# Patient Record
Sex: MALE | Race: BLACK OR AFRICAN AMERICAN | NOT HISPANIC OR LATINO | Employment: FULL TIME | URBAN - METROPOLITAN AREA
[De-identification: names, ages, dates, MRNs, and addresses within clinical notes are randomized per-mention and may not be internally consistent; named-entity substitution may affect disease eponyms.]

---

## 2019-02-20 ENCOUNTER — HOSPITAL ENCOUNTER (EMERGENCY)
Facility: HOSPITAL | Age: 59
Discharge: HOME | End: 2019-02-20
Attending: EMERGENCY MEDICINE | Admitting: EMERGENCY MEDICINE
Payer: COMMERCIAL

## 2019-02-20 ENCOUNTER — APPOINTMENT (EMERGENCY)
Dept: RADIOLOGY | Facility: HOSPITAL | Age: 59
End: 2019-02-20
Payer: COMMERCIAL

## 2019-02-20 VITALS
DIASTOLIC BLOOD PRESSURE: 70 MMHG | OXYGEN SATURATION: 98 % | RESPIRATION RATE: 20 BRPM | SYSTOLIC BLOOD PRESSURE: 146 MMHG | HEART RATE: 59 BPM
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
## 2019-02-20 DIAGNOSIS — S70.02XA CONTUSION OF LEFT HIP, INITIAL ENCOUNTER: ICD-10-CM

## 2019-02-20 PROCEDURE — 73502 X-RAY EXAM HIP UNI 2-3 VIEWS: CPT

## 2019-02-20 PROCEDURE — 99283 EMERGENCY DEPT VISIT LOW MDM: CPT

## 2019-02-20 RX ORDER — METHOCARBAMOL 500 MG/1
1000 TABLET, FILM COATED ORAL 2 TIMES DAILY
Qty: 30 TABLET | Refills: 0 | Status: SHIPPED | OUTPATIENT
Start: 2019-02-20 | End: 2020-03-31 | Stop reason: ALTCHOICE

## 2019-02-20 RX ORDER — IBUPROFEN 600 MG/1
600 TABLET ORAL EVERY 6 HOURS PRN
Qty: 30 TABLET | Refills: 0 | Status: SHIPPED | OUTPATIENT
Start: 2019-02-20 | End: 2019-02-20 | Stop reason: SDUPTHER

## 2019-02-20 RX ORDER — IBUPROFEN 600 MG/1
600 TABLET ORAL EVERY 6 HOURS PRN
Qty: 30 TABLET | Refills: 0 | Status: SHIPPED | OUTPATIENT
Start: 2019-02-20 | End: 2020-07-15

## 2019-02-20 RX ORDER — METHOCARBAMOL 500 MG/1
1000 TABLET, FILM COATED ORAL ONCE
Status: DISCONTINUED | OUTPATIENT
Start: 2019-02-20 | End: 2019-02-20 | Stop reason: HOSPADM

## 2019-02-20 RX ORDER — METHOCARBAMOL 500 MG/1
1000 TABLET, FILM COATED ORAL 2 TIMES DAILY
Qty: 30 TABLET | Refills: 0 | Status: SHIPPED | OUTPATIENT
Start: 2019-02-20 | End: 2019-02-20 | Stop reason: SDUPTHER

## 2019-02-20 RX ORDER — IBUPROFEN 600 MG/1
600 TABLET ORAL ONCE
Status: DISCONTINUED | OUTPATIENT
Start: 2019-02-20 | End: 2019-02-20 | Stop reason: HOSPADM

## 2019-02-21 NOTE — ED PROVIDER NOTES
History  Chief Complaint   Patient presents with    Fall     pt states he fell on ice at store and struck left hip/leg on ground; denies thinners     8year-old male patient presents emergency department for evaluation of a slip and fall injury with left hip contusion  According to the patient got out of car, lost his footing and she device, fell onto his rear end striking his left hip and his left shin on the door  The patient came in for evaluation of left hip pain  The patient, however, is ambulatory, has good pulse motor and sensory in the affected extremity, will have a but be evaluated with      History provided by:  Patient   used: No    Fall   Mechanism of injury: fall    Injury location:  Pelvis  Pelvic injury location:  Pelvis  Arrived directly from scene: no    Fall:     Fall occurred:  Tripped and walking  Prior to arrival data:     Blood loss:  None    Orientation at scene:  Person, place, situation and time    Loss of consciousness: no      Amnesic to event: no      Airway interventions:  None  Associated symptoms: no abdominal pain, no back pain, no blindness, no chest pain, no difficulty breathing, no hearing loss, no nausea and no vomiting    Risk factors: no anticoagulation therapy, no CABG, no COPD, no pacemaker, not pregnant and no steroid use        None       No past medical history on file  Past Surgical History:   Procedure Laterality Date    KNEE SURGERY         No family history on file  I have reviewed and agree with the history as documented  Social History     Tobacco Use    Smoking status: Former Smoker    Smokeless tobacco: Never Used   Substance Use Topics    Alcohol use: Yes    Drug use: Never        Review of Systems   HENT: Negative for hearing loss  Eyes: Negative for blindness  Cardiovascular: Negative for chest pain  Gastrointestinal: Negative for abdominal pain, nausea and vomiting  Musculoskeletal: Negative for back pain     All other systems reviewed and are negative  Physical Exam  Physical Exam   Constitutional: He is oriented to person, place, and time  He appears well-developed and well-nourished  No distress  HENT:   Head: Normocephalic and atraumatic  Right Ear: External ear normal    Left Ear: External ear normal    Eyes: Conjunctivae and EOM are normal  Right eye exhibits no discharge  Left eye exhibits no discharge  No scleral icterus  Neck: Normal range of motion  Neck supple  No JVD present  No tracheal deviation present  No thyromegaly present  Cardiovascular: Normal rate and regular rhythm  Pulmonary/Chest: Effort normal and breath sounds normal  No stridor  No respiratory distress  He has no wheezes  He has no rales  Abdominal: Soft  Bowel sounds are normal  He exhibits no distension  There is no tenderness  Musculoskeletal: Normal range of motion  He exhibits no edema, tenderness or deformity  Neurological: He is alert and oriented to person, place, and time  No cranial nerve deficit  Coordination normal    Skin: Skin is warm and dry  He is not diaphoretic  Psychiatric: He has a normal mood and affect  His behavior is normal    Nursing note and vitals reviewed        Vital Signs  ED Triage Vitals   Temp Pulse Respirations Blood Pressure SpO2   -- 02/20/19 2038 02/20/19 2038 02/20/19 2054 02/20/19 2038    59 20 146/70 98 %      Temp src Heart Rate Source Patient Position - Orthostatic VS BP Location FiO2 (%)   -- 02/20/19 2038 -- -- --    Monitor         Pain Score       02/20/19 2038       6           Vitals:    02/20/19 2038 02/20/19 2054   BP:  146/70   Pulse: 59        Visual Acuity      ED Medications  Medications - No data to display    Diagnostic Studies  Results Reviewed     None                 XR hip/pelv 2-3 vws left if performed   ED Interpretation by Kelly Schmidt DO (02/20 2119)   No acute fracture      Final Result by Nehemias Bob MD (02/20 2137)      No evidence of acute left hip fracture or dislocation  Workstation performed: [id number]                    Procedures  Procedures       Phone Contacts  ED Phone Contact    ED Course                               MDM  Number of Diagnoses or Management Options  Contusion of left hip, initial encounter: new and requires workup  Fall, initial encounter: new and requires workup     Amount and/or Complexity of Data Reviewed  Clinical lab tests: ordered and reviewed  Tests in the radiology section of CPT®: ordered and reviewed  Decide to obtain previous medical records or to obtain history from someone other than the patient: yes  Review and summarize past medical records: yes    Patient Progress  Patient progress: stable      Disposition  Final diagnoses:   Fall, initial encounter   Contusion of left hip, initial encounter     Time reflects when diagnosis was documented in both MDM as applicable and the Disposition within this note     Time User Action Codes Description Comment    2/20/2019  9:19 PM Priscila Rincon Add [Z37  XXXA] Fall, initial encounter     2/20/2019  9:19 PM Priscila Rincon Add [S70 02XA] Contusion of left hip, initial encounter       ED Disposition     ED Disposition Condition Date/Time Comment    Discharge Stable Wed Feb 20, 2019  9:19 PM Antonio George discharge to home/self care              Follow-up Information     Follow up With Specialties Details Why [facility],  Family Medicine   [address] [place] [address] [address]   [address] [place] [id number]  [phone]            Discharge Medication List as of 2/20/2019  9:22 PM      START taking these medications    Details   ibuprofen (MOTRIN) 600 mg tablet Take 1 tablet (600 mg total) by mouth every 6 (six) hours as needed for mild pain for up to 3 days, Starting Wed 2/20/2019, Until Sat 2/23/2019, Normal      methocarbamol (ROBAXIN) 500 mg tablet Take 2 tablets (1,000 mg total) by mouth 2 (two) times a day, Starting Wed 2/20/2019, Normal           No discharge procedures on file      ED Provider  Electronically Signed by           Sherry Dasilva DO  02/22/19 4341

## 2019-03-23 ENCOUNTER — HOSPITAL ENCOUNTER (EMERGENCY)
Facility: HOSPITAL | Age: 59
Discharge: HOME | End: 2019-03-23
Attending: EMERGENCY MEDICINE | Admitting: EMERGENCY MEDICINE
Payer: COMMERCIAL

## 2019-03-23 VITALS
DIASTOLIC BLOOD PRESSURE: 81 MMHG | OXYGEN SATURATION: 98 % | WEIGHT: 245 LBS | HEART RATE: 50 BPM | SYSTOLIC BLOOD PRESSURE: 171 MMHG | BODY MASS INDEX: 33.18 KG/M2 | RESPIRATION RATE: 21 BRPM | HEIGHT: 72 IN | TEMPERATURE: 98.4 F

## 2019-03-23 DIAGNOSIS — S76.311A RIGHT HAMSTRING MUSCLE STRAIN: Primary | ICD-10-CM

## 2019-03-23 DIAGNOSIS — M79.18 PAIN IN RIGHT BUTTOCK: ICD-10-CM

## 2019-03-23 LAB
ATRIAL RATE: 45 BPM
P AXIS: 64 DEGREES
PR INTERVAL: 182 MS
QRS AXIS: 31 DEGREES
QRSD INTERVAL: 106 MS
QT INTERVAL: 436 MS
QTC INTERVAL: 377 MS
T WAVE AXIS: 9 DEGREES
VENTRICULAR RATE: 45 BPM

## 2019-03-23 PROCEDURE — 93010 ELECTROCARDIOGRAM REPORT: CPT | Performed by: INTERNAL MEDICINE

## 2019-03-23 PROCEDURE — 99283 EMERGENCY DEPT VISIT LOW MDM: CPT

## 2019-03-23 PROCEDURE — 93005 ELECTROCARDIOGRAM TRACING: CPT

## 2019-03-23 RX ORDER — IBUPROFEN 600 MG/1
600 TABLET ORAL ONCE
Status: COMPLETED | OUTPATIENT
Start: 2019-03-23 | End: 2019-03-23

## 2019-03-23 RX ORDER — CYCLOBENZAPRINE HCL 10 MG
10 TABLET ORAL 2 TIMES DAILY PRN
Qty: 10 TABLET | Refills: 0 | Status: SHIPPED | OUTPATIENT
Start: 2019-03-23 | End: 2019-04-11

## 2019-03-23 RX ORDER — CYCLOBENZAPRINE HCL 10 MG
10 TABLET ORAL ONCE
Status: COMPLETED | OUTPATIENT
Start: 2019-03-23 | End: 2019-03-23

## 2019-03-23 RX ORDER — LIDOCAINE 50 MG/G
1 PATCH TOPICAL ONCE
Status: DISCONTINUED | OUTPATIENT
Start: 2019-03-23 | End: 2019-03-23 | Stop reason: HOSPADM

## 2019-03-23 RX ADMIN — CYCLOBENZAPRINE HYDROCHLORIDE 10 MG: 10 TABLET, FILM COATED ORAL at 09:41

## 2019-03-23 RX ADMIN — LIDOCAINE 1 PATCH: 50 PATCH TOPICAL at 09:41

## 2019-03-23 RX ADMIN — IBUPROFEN 600 MG: 600 TABLET ORAL at 09:41

## 2019-03-23 NOTE — DISCHARGE INSTRUCTIONS
Take 400-600 mg Ibuprofen every 6 hours  Take muscle relaxant as needed- do not take both at the same time  Do not drive/work while taking  Take Tylenol as needed  Apply Lidoderm patches/cream as needed- patches can be bought over the counter- on for 12 hours/off for 12 hours  Try applying heat and doing stretches  Call to establish care with Sports Medicine doctor  Call to follow up with PCP regarding blood pressure/heart rate   Return to ED if any new/worsening symptoms

## 2019-03-23 NOTE — ED PROVIDER NOTES
History  Chief Complaint   Patient presents with    Leg Pain     Patient c/o right sided leg pain that radiates down his leg and up into the lower back  Patient states pain is worse after prolonged sitting  Patient denies any acute injury  60 yo M presenting with 1 week of R buttock/posterior thigh pain  Pt reports pain started spontaneously, cannot recall any inciting event or trauma  Pt reprots pain mainly to R buttocks, but radiates down posterior thigh, does not extend beyond knee  Reports pain is worse in the morning, improves with activity/movement/throughout the day  Reports feels like when he injured his hamspring in the past  Has been able to ambulate  No direct trauma  Tried Robaxin and Ibuprofen, last does was last night  Does report he had a fall about 3 weeks ago, fell on ice and landed on L hip  Seen in ED at that time, x-ray negative, pain to L hip completely resolved  Denies that he was walking differently/favoring R side from that injury  Denies any numbness/tingling/weakness  Denies any fevers, chills, abnormal weight loss, CP, SOB, back pain, abdominal pain, N/V/D/C, urinary complaints, retention/incontinence, saddle anesthesia, numbness/tingling  No chronic medical problems  Pt noted to be bradycardic, reports that this is chronic and has been worked up in the past  Denies any lightheadedness/dizziness, near syncope/syncope  Denies tobacco/drug use, occasional ETOH  A/P: 60 yo M with R buttock/posterior thigh pain, likely MSK strain/possibly pain from nerve- no red flags, symptomatic treatment with NSAIDs/muscle relaxants/lidoderm patches/icy-hot, stretching exercises, sports med f/u  Given bradycardia- will get EKG to r/o heart block  F/u PCP regarding bradycardia and elevated BP          Prior to Admission Medications   Prescriptions Last Dose Informant Patient Reported?  Taking?   ibuprofen (MOTRIN) 600 mg tablet   No No   Sig: Take 1 tablet (600 mg total) by mouth every 6 (six) hours as needed for mild pain for up to 3 days   methocarbamol (ROBAXIN) 500 mg tablet   No No   Sig: Take 2 tablets (1,000 mg total) by mouth 2 (two) times a day      Facility-Administered Medications: None       History reviewed  No pertinent past medical history  Past Surgical History:   Procedure Laterality Date    KNEE SURGERY         History reviewed  No pertinent family history  I have reviewed and agree with the history as documented  Social History     Tobacco Use    Smoking status: Former Smoker    Smokeless tobacco: Never Used   Substance Use Topics    Alcohol use: Yes    Drug use: Never        Review of Systems   Constitutional: Negative for chills and fever  HENT: Negative for rhinorrhea and sore throat  Respiratory: Negative for cough and shortness of breath  Cardiovascular: Negative for chest pain and leg swelling  Gastrointestinal: Negative for abdominal pain, constipation, diarrhea, nausea and vomiting  Genitourinary: Negative for dysuria, frequency, hematuria and urgency  Musculoskeletal: Negative for back pain, myalgias and neck pain         + R buttock/thigh pain   Skin: Negative for color change and rash  Allergic/Immunologic: Negative for immunocompromised state  Neurological: Negative for dizziness, weakness, light-headedness, numbness and headaches  Hematological: Negative for adenopathy  Does not bruise/bleed easily  Psychiatric/Behavioral: Negative for agitation and confusion  All other systems reviewed and are negative  Physical Exam  Physical Exam   Constitutional: He is oriented to person, place, and time  He appears well-developed and well-nourished  HENT:   Head: Normocephalic and atraumatic  Mouth/Throat: Oropharynx is clear and moist    Cardiovascular: Normal rate, regular rhythm, normal heart sounds and intact distal pulses  Pulmonary/Chest: Effort normal and breath sounds normal  No respiratory distress  Abdominal: Soft   He exhibits no distension  There is no tenderness  There is no rebound  Back: no midline C/T/L spine ttp  Negative straight leg raise test b/l  Normal reflexes  Normal strength 5/5 throughout  Sensation grossly intact  No skin changes/swelling  TTP over R ischial tuberosity and mildly along R posterior thigh  Full ROM of all joints including b/l hips  Musculoskeletal: Normal range of motion  He exhibits no edema or deformity  Neurological: He is alert and oriented to person, place, and time  He exhibits normal muscle tone  Coordination normal    Skin: Skin is warm and dry  No rash noted  Psychiatric: He has a normal mood and affect  Judgment and thought content normal    Nursing note and vitals reviewed        Vital Signs  ED Triage Vitals   Temperature Pulse Respirations Blood Pressure SpO2   03/23/19 0919 03/23/19 0917 03/23/19 0917 03/23/19 0917 03/23/19 0917   98 4 °F (36 9 °C) (!) 43 18 (!) 179/113 98 %      Temp Source Heart Rate Source Patient Position - Orthostatic VS BP Location FiO2 (%)   03/23/19 0919 03/23/19 0917 03/23/19 0917 03/23/19 0917 --   Oral Monitor Sitting Right arm       Pain Score       03/23/19 0941       Worst Possible Pain           Vitals:    03/23/19 0917 03/23/19 0945 03/23/19 0957   BP: (!) 179/113  (!) 171/81   Pulse: (!) 43 (!) 48 (!) 50   Patient Position - Orthostatic VS: Sitting           Visual Acuity      ED Medications  Medications   lidocaine (LIDODERM) 5 % patch 1 patch (1 patch Topical Medication Applied 3/23/19 0941)   ibuprofen (MOTRIN) tablet 600 mg (600 mg Oral Given 3/23/19 0941)   cyclobenzaprine (FLEXERIL) tablet 10 mg (10 mg Oral Given 3/23/19 0941)       Diagnostic Studies  Results Reviewed     None                 No orders to display              Procedures  ECG 12 Lead Documentation  Date/Time: 3/23/2019 9:50 AM  Performed by: Noman Souza DO  Authorized by: Noman Souza DO     Indications / Diagnosis:  Bradycardia, asymptomatic  ECG reviewed by me, the ED Provider: yes    Patient location:  ED  Previous ECG:     Previous ECG:  Unavailable  Rate:     ECG rate:  45  Rhythm:     Rhythm: sinus rhythm    Ectopy:     Ectopy: none    QRS:     QRS axis:  Normal  Conduction:     Conduction: normal    ST segments:     ST segments:  Non-specific  T waves:     T waves: normal             Phone Contacts  ED Phone Contact    ED Course                               MDM  Number of Diagnoses or Management Options  Pain in right buttock:   Right hamstring muscle strain:   Diagnosis management comments: R buttock/posterior thigh pain, likely MSK strain (hamstring)/possibly pain from nerve- no red flags, symptomatic treatment with NSAIDs/muscle relaxants/lidoderm patches/icy-hot, stretching exercises, sports med f/u  asymptomatic HTN/bradycardia- f/u PCP  Return precautions discussed with pt who expressed understanding       Amount and/or Complexity of Data Reviewed  Tests in the radiology section of CPT®: reviewed  Review and summarize past medical records: yes        Disposition  Final diagnoses:   Right hamstring muscle strain   Pain in right buttock     Time reflects when diagnosis was documented in both MDM as applicable and the Disposition within this note     Time User Action Codes Description Comment    3/23/2019  9:42 AM Mannie MARTIN Add [H52 579H] Right hamstring muscle strain     3/23/2019  9:44 AM Mannie MARTIN Add [M79 18] Pain in right buttock       ED Disposition     ED Disposition Condition Date/Time Comment    Discharge Stable Sat Mar 23, 2019  9:42 AM Arlester Schirmer discharge to home/self care              Follow-up Information     Follow up With Specialties Details Why [address] [place] [address] Family Medicine   [facility] [id number] [place] [address] [id number]            Patient's Medications   Discharge Prescriptions    CYCLOBENZAPRINE (FLEXERIL) 10 MG TABLET    Take 1 tablet (10 mg total) by mouth 2 (two) times a day as needed for muscle spasms       Start Date: 3/23/2019 End Date: --       Order Dose: 10 mg       Quantity: 10 tablet    Refills: 0     No discharge procedures on file      ED Provider  Electronically Signed by           Santiago Quintanilla DO  03/23/19 7216

## 2019-03-27 ENCOUNTER — APPOINTMENT (OUTPATIENT)
Dept: RADIOLOGY | Facility: CLINIC | Age: 59
End: 2019-03-27
Payer: COMMERCIAL

## 2019-03-27 VITALS — DIASTOLIC BLOOD PRESSURE: 91 MMHG | SYSTOLIC BLOOD PRESSURE: 170 MMHG | HEART RATE: 45 BPM

## 2019-03-27 DIAGNOSIS — M54.50 LUMBAR BACK PAIN: ICD-10-CM

## 2019-03-27 DIAGNOSIS — M54.50 LUMBAR BACK PAIN: Primary | ICD-10-CM

## 2019-03-27 PROCEDURE — 99203 OFFICE O/P NEW LOW 30 MIN: CPT | Performed by: PHYSICIAN ASSISTANT

## 2019-03-27 PROCEDURE — 72110 X-RAY EXAM L-2 SPINE 4/>VWS: CPT

## 2019-03-27 RX ORDER — PREDNISONE 20 MG/1
TABLET ORAL
Qty: 12 TABLET | Refills: 0 | Status: SHIPPED | OUTPATIENT
Start: 2019-03-27 | End: 2019-04-11

## 2019-03-27 NOTE — PROGRESS NOTES
_____________________________________________________  CHIEF COMPLAINT:  Chief Complaint   Patient presents with    Lower Back - Pain         SUBJECTIVE:  Bacilio Quezada is a 59y o  year old male who presents right upper hamstring pain  Patient states that has been going on for 1 week  He describes the pain as a burning sensation  He states it radiates down the back of the leg  He denies pain going down the knee  He does note occasional pain at the ankle that does go up his leg  He notes a cramping sensation in his hamstring  He has tried taking some anti-inflammatories and muscle relaxants with only some relief  He states some of his pain is at night time  States the pain is constant  He denies any numbness or tingling  He denies any constitutional signs or symptoms  PAST MEDICAL HISTORY:  History reviewed  No pertinent past medical history  PAST SURGICAL HISTORY:  Past Surgical History:   Procedure Laterality Date    KNEE SURGERY         FAMILY HISTORY:  Family History   Problem Relation Age of Onset    No Known Problems Mother     Heart disease Father        SOCIAL HISTORY:  Social History     Tobacco Use    Smoking status: Former Smoker    Smokeless tobacco: Never Used   Substance Use Topics    Alcohol use:  Yes    Drug use: Never       MEDICATIONS:    Current Outpatient Medications:     cyclobenzaprine (FLEXERIL) 10 mg tablet, Take 1 tablet (10 mg total) by mouth 2 (two) times a day as needed for muscle spasms, Disp: 10 tablet, Rfl: 0    ibuprofen (MOTRIN) 600 mg tablet, Take 1 tablet (600 mg total) by mouth every 6 (six) hours as needed for mild pain for up to 3 days, Disp: 30 tablet, Rfl: 0    methocarbamol (ROBAXIN) 500 mg tablet, Take 2 tablets (1,000 mg total) by mouth 2 (two) times a day, Disp: 30 tablet, Rfl: 0    predniSONE 20 mg tablet, Take 3 tablets by mouth once a day for 2 days, Take 2 tablets by mouth once a day for 2 days, Take 1 tablet by mouth once a day for 2 days, Disp: 12 tablet, Rfl: 0    ALLERGIES:  Allergies   Allergen Reactions    Sulfa Antibiotics      itching       REVIEW OF SYSTEMS:  General: no fever, no chills  HEENT:  No loss of hearing or eyesight problems  Eyes:  No red eyes  Respiratory:  No coughing, shortness of breath or wheezing  Cardiovascular:  No chest pain, no palpitations  GI:  Abdomen soft nontender, denies nausea  Endocrine:  No muscle weakness, no frequent urination, no excessive thirst  Urinary:  No dysuria, no incontinence  Musculoskeletal: see HPI and PE  SKIN:  No skin rash, no dry skin  Neurological:  No headaches, no confusion  Psychiatric:  No suicide thoughts, no anxiety, no depression  Review of all other systems is negative    LABS:  HgA1c: No results found for: HGBA1C  BMP: No results found for: GLUCOSE, CALCIUM, NA, K, CO2, CL, BUN, CREATININE    _____________________________________________________  PHYSICAL EXAMINATION:  Vitals:    03/27/19 1440   BP: 170/91   Pulse: (!) 45       General: well developed and well nourished, alert, oriented times 3 and appears comfortable  Psychiatric: Normal  HEENT: Trachea Midline, No torticollis  Cardiovascular: No discernable arrhythmia  Pulmonary: No wheezing or stridor  Skin: No masses, erthema, lacerations, fluctation, ulcerations  Neurovascular:  L1-S1 motor and sensory intact, pulses were compared bilaterally and were equal, capillary refill less than 3 sec    MUSCULOSKELETAL EXAMINATION:  Right upper leg:  No erythema edema or ecchymosis noted  Skin is warm to touch  Tenderness to palpation over the muscle belly of the right hamstring  Range of motion is within normal limits  Strength is 5/5 in all directions  Only some mild tightness with hamstring flexibility    Negative femoral nerve stretch test, lumbar disc, negative Stinchfield test   Patient is neurovascular intact    _____________________________________________________  STUDIES REVIEWED:  I personally reviewed the x-rays from 03/27/2019 and 02/20/2019  X-rays of the hip demonstrate no acute fractures or dislocations  X-rays of the lumbar spine demonstrate some osteophyte formation on the anterior aspects of the vertebral bodies at multiple levels  No evidence of acute fractures or dislocations  ASSESSMENT/PLAN:    Diagnoses and all orders for this visit:    Lumbar back pain  -     XR spine lumbar minimum 4 views non injury; Future  -     predniSONE 20 mg tablet; Take 3 tablets by mouth once a day for 2 days, Take 2 tablets by mouth once a day for 2 days, Take 1 tablet by mouth once a day for 2 days  -     Ambulatory referral to Physical Therapy; Future        Assessment:   Hamstring pain with some sciatica    Plan:   There is discussed with the patient that we will start a prednisone taper to see if this helps with the burning sensation he has a goes down the back of his leg  Will start a course of physical therapy to work on range of motion, stretching, strengthening and modalities as needed for pain  Will have follow-up in 2 weeks with Dr Kyle Castorena for further evaluation  Patient's blood pressure was elevated today on 2 separate readings  5 by minutes  It was advised that the patient follow up with their PCP for further evaluation of their high blood pressure  Follow Up:   Two weeks    PROCEDURES PERFORMED:  None

## 2019-04-11 ENCOUNTER — OFFICE VISIT (OUTPATIENT)
Dept: OBGYN CLINIC | Facility: CLINIC | Age: 59
End: 2019-04-11
Payer: COMMERCIAL

## 2019-04-11 VITALS
BODY MASS INDEX: 35.21 KG/M2 | SYSTOLIC BLOOD PRESSURE: 138 MMHG | HEART RATE: 47 BPM | WEIGHT: 260 LBS | HEIGHT: 72 IN | DIASTOLIC BLOOD PRESSURE: 76 MMHG

## 2019-04-11 DIAGNOSIS — M47.816 FACET ARTHRITIS OF LUMBAR REGION: Primary | ICD-10-CM

## 2019-04-11 DIAGNOSIS — G57.01 PIRIFORMIS SYNDROME OF RIGHT SIDE: ICD-10-CM

## 2019-04-11 DIAGNOSIS — M51.36 DEGENERATIVE DISC DISEASE, LUMBAR: ICD-10-CM

## 2019-04-11 DIAGNOSIS — M48.061 NEURAL FORAMINAL STENOSIS OF LUMBAR SPINE: ICD-10-CM

## 2019-04-11 PROCEDURE — 99213 OFFICE O/P EST LOW 20 MIN: CPT | Performed by: FAMILY MEDICINE

## 2020-03-30 PROBLEM — E78.2 MIXED DYSLIPIDEMIA: Status: ACTIVE | Noted: 2018-07-20

## 2020-03-31 ENCOUNTER — TELEMEDICINE (OUTPATIENT)
Dept: FAMILY MEDICINE CLINIC | Facility: HOSPITAL | Age: 60
End: 2020-03-31
Payer: COMMERCIAL

## 2020-03-31 VITALS — WEIGHT: 245 LBS | HEIGHT: 72 IN | BODY MASS INDEX: 33.18 KG/M2

## 2020-03-31 DIAGNOSIS — Z13.0 SCREENING FOR ENDOCRINE, METABOLIC AND IMMUNITY DISORDER: ICD-10-CM

## 2020-03-31 DIAGNOSIS — N52.8 OTHER MALE ERECTILE DYSFUNCTION: ICD-10-CM

## 2020-03-31 DIAGNOSIS — Z12.11 SCREEN FOR COLON CANCER: ICD-10-CM

## 2020-03-31 DIAGNOSIS — Z13.1 SCREENING FOR DIABETES MELLITUS: ICD-10-CM

## 2020-03-31 DIAGNOSIS — Z13.228 SCREENING FOR ENDOCRINE, METABOLIC AND IMMUNITY DISORDER: ICD-10-CM

## 2020-03-31 DIAGNOSIS — Z76.89 ENCOUNTER TO ESTABLISH CARE: Primary | ICD-10-CM

## 2020-03-31 DIAGNOSIS — Z13.29 SCREENING FOR ENDOCRINE, METABOLIC AND IMMUNITY DISORDER: ICD-10-CM

## 2020-03-31 DIAGNOSIS — Z12.5 SCREENING FOR PROSTATE CANCER: ICD-10-CM

## 2020-03-31 DIAGNOSIS — E78.2 MIXED DYSLIPIDEMIA: ICD-10-CM

## 2020-03-31 PROCEDURE — G2012 BRIEF CHECK IN BY MD/QHP: HCPCS | Performed by: NURSE PRACTITIONER

## 2020-03-31 RX ORDER — SILDENAFIL 100 MG/1
100 TABLET, FILM COATED ORAL DAILY PRN
COMMUNITY

## 2020-03-31 RX ORDER — TADALAFIL 20 MG/1
20 TABLET ORAL DAILY PRN
COMMUNITY

## 2020-03-31 NOTE — ASSESSMENT & PLAN NOTE
Previous doctor prescribed both Cialis and Viagra to use alternating  Script gets printed and gets medications from [place] [facility]

## 2020-03-31 NOTE — PROGRESS NOTES
Virtual Regular Visit    Problem List Items Addressed This Visit        Other    Mixed dyslipidemia    Relevant Orders    Lipid panel    Other male erectile dysfunction     Previous doctor prescribed both Cialis and Viagra to use alternating  Script gets printed and gets medications from [place] ([facility])  Other Visit Diagnoses     Encounter to establish care    -  Primary    Screening for endocrine, metabolic and immunity disorder        Relevant Orders    CBC and differential    Comprehensive metabolic panel    Screening for diabetes mellitus        Relevant Orders    Hemoglobin A1C    Screen for colon cancer        Relevant Orders    Ambulatory referral to Gastroenterology    Screening for prostate cancer        Relevant Orders    PSA (Reflex To Free) (Serial)          BMI Counseling: Body mass index is 33 23 kg/m²  The BMI is above normal  Nutrition recommendations include decreasing portion sizes, encouraging healthy choices of fruits and vegetables, decreasing fast food intake, consuming healthier snacks, limiting drinks that contain sugar, moderation in carbohydrate intake and increasing intake of lean protein  Exercise recommendations include moderate physical activity 150 minutes/week  No pharmacotherapy was ordered  Reason for visit is establish care    Encounter provider GIULIANO Hall    Provider located at [address] [place] [address] [address] [place] [address]      Recent Visits  No visits were found meeting these conditions     Showing recent visits within past 7 days and meeting all other requirements     Today's Visits  Date Type Provider Dept   03/31/20 4401 GIULIANO Neville Pg, Md   Showing today's visits and meeting all other requirements     Future Appointments  Date Type Provider Dept   03/31/20 4401 GIULIANO Neville Pg, Md   Showing future appointments within next 150 days and meeting all other requirements        The patient was identified by name and date of birth  Prosper Page was informed that this is a telemedicine visit and that the visit is being conducted through [employer] and patient was informed that this is not a secure, HIPAA-complaint platform  he agrees to proceed     My office door was closed  No one else was in the room  He acknowledged consent and understanding of privacy and security of the video platform  The patient has agreed to participate and understands they can discontinue the visit at any time  Patient is aware this is a billable service  Subjective  Prosper Page is a 62 y o  male   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  Looking for provider local to his home in [facility]  Denies any problems currently  H/o some issues with kidney numbers being elevated  Last labs were done 1 year ago  Father with h/o CAD and MI at age 79  Uses both Cialis and Viagra  Alternates  Previous physician prescribed both and would print prescriptions for him to get from [facility] he is UTD with tetanus  UTS with hep C and HIV screenings (see Care Everywhere)  Last colonoscopy was 10 years ago  History reviewed  No pertinent past medical history  Past Surgical History:   Procedure Laterality Date    KNEE SURGERY         Current Outpatient Medications   Medication Sig Dispense Refill    sildenafil (VIAGRA) 100 mg tablet Take 100 mg by mouth daily as needed for erectile dysfunction      tadalafil (CIALIS) 20 MG tablet Take 20 mg by mouth daily as needed for erectile dysfunction      ibuprofen (MOTRIN) 600 mg tablet Take 1 tablet (600 mg total) by mouth every 6 (six) hours as needed for mild pain for up to 3 days 30 tablet 0     No current facility-administered medications for this visit  Allergies   Allergen Reactions    Sulfa Antibiotics      itching       Review of Systems   Constitutional: Negative for chills and fever  Respiratory: Negative for shortness of breath  Cardiovascular: Negative for chest pain, palpitations and leg swelling  Genitourinary:        Erectile dysfunction   Psychiatric/Behavioral: Negative for dysphoric mood  The patient is not nervous/anxious  Video Exam    Vitals:    03/31/20 1706   Weight: 111 kg (245 lb)   Height: 6' (1 829 m)       Physical Exam   Constitutional: He is oriented to person, place, and time  He appears well-developed and well-nourished  Neurological: He is alert and oriented to person, place, and time  Psychiatric: He has a normal mood and affect  His behavior is normal  Judgment and thought content normal         I spent 20 minutes with the patient during this visit  Medical history was reviewed, screening labs ordered     [id number]

## 2020-04-02 ENCOUNTER — TELEPHONE (OUTPATIENT)
Dept: ADMINISTRATIVE | Facility: OTHER | Age: 60
End: 2020-04-02

## 2020-07-15 ENCOUNTER — OFFICE VISIT (OUTPATIENT)
Dept: FAMILY MEDICINE CLINIC | Facility: HOSPITAL | Age: 60
End: 2020-07-15
Payer: COMMERCIAL

## 2020-07-15 VITALS
SYSTOLIC BLOOD PRESSURE: 132 MMHG | HEART RATE: 53 BPM | DIASTOLIC BLOOD PRESSURE: 78 MMHG | BODY MASS INDEX: 37.19 KG/M2 | WEIGHT: 259.8 LBS | TEMPERATURE: 97.4 F | HEIGHT: 70 IN

## 2020-07-15 DIAGNOSIS — G47.10 HYPERSOMNOLENCE: ICD-10-CM

## 2020-07-15 DIAGNOSIS — E66.01 CLASS 2 SEVERE OBESITY DUE TO EXCESS CALORIES WITH SERIOUS COMORBIDITY AND BODY MASS INDEX (BMI) OF 37.0 TO 37.9 IN ADULT (HCC): ICD-10-CM

## 2020-07-15 DIAGNOSIS — N52.8 OTHER MALE ERECTILE DYSFUNCTION: ICD-10-CM

## 2020-07-15 DIAGNOSIS — E78.2 MIXED DYSLIPIDEMIA: ICD-10-CM

## 2020-07-15 DIAGNOSIS — Z13.1 SCREENING FOR DIABETES MELLITUS (DM): ICD-10-CM

## 2020-07-15 DIAGNOSIS — Z12.5 SCREENING PSA (PROSTATE SPECIFIC ANTIGEN): ICD-10-CM

## 2020-07-15 DIAGNOSIS — M79.621 PAIN IN RIGHT UPPER ARM: Primary | ICD-10-CM

## 2020-07-15 DIAGNOSIS — R06.83 SNORING: ICD-10-CM

## 2020-07-15 PROCEDURE — 99214 OFFICE O/P EST MOD 30 MIN: CPT | Performed by: FAMILY MEDICINE

## 2020-07-15 PROCEDURE — 1036F TOBACCO NON-USER: CPT | Performed by: FAMILY MEDICINE

## 2020-07-15 PROCEDURE — 3008F BODY MASS INDEX DOCD: CPT | Performed by: FAMILY MEDICINE

## 2020-07-15 NOTE — PROGRESS NOTES
Assessment/Plan:      Problem List Items Addressed This Visit        Other    Mixed dyslipidemia    Relevant Orders    CBC    Comprehensive metabolic panel    Lipid Panel with Direct LDL reflex    TSH, 3rd generation with Free T4 reflex    Magnesium    Other male erectile dysfunction    Relevant Orders    CBC    Comprehensive metabolic panel    Testosterone, free, total    Prolactin      Other Visit Diagnoses     Pain in right upper arm    -  Primary    Relevant Orders    Ambulatory referral to Orthopedic Surgery    Ambulatory referral to Physical Therapy    Class 2 severe obesity due to excess calories with serious comorbidity and body mass index (BMI) of 37 0 to 37 9 in adult [facility])        Screening for diabetes mellitus (DM)        Relevant Orders    Hemoglobin A1C    Snoring        Relevant Orders    Home Study    Hypersomnolence        Relevant Orders    Home Study    Screening PSA (prostate specific antigen)        Relevant Orders    PSA, Total Screen       pain in the right upper arm  I do think this is more of strain/sprain of the deltoid muscle at its insertion  Referral to physical therapy as well as Orthopedics for further evaluation  Discussed snoring and hypersomnolence  Advised and agree to obtaining sleep study  ED  Stable with periodic use of viagra or cialis  Check testosterone and prolactin  Dylipidemia  Update labs  Screening labs as outline  Monitor BP at home  Discussed salt intake, discussed weight loss, regular exercise  Subjective:   Chief Complaint   Patient presents with    Arm Pain     Right        Patient ID: Marc Bernard is a 62 y o  male  Pt here for arm pain  2 year of arm pain  Right side  Upper mid arm  Increase pain with certain movements  No improvement with giving it time to rest    No injury or increase activity  No neck pain  No elbow pain  No radiation of pain  No numbness or tingling       Review showing he does snore  With hypersomnelence around midday  Has not had sleep study to evaluate for MOHAN  Reports bp is high coming to physician's office  Has not checked Bp at home  Does eat some slaty food  Keeps active but no regular exercise  The following portions of the patient's history were reviewed and updated as appropriate: allergies, current medications, past family history, past medical history, past social history, past surgical history and problem list     Review of Systems   Constitutional: Negative  Negative for activity change, appetite change, chills and diaphoresis  HENT: Negative for congestion and dental problem  Respiratory: Negative  Negative for apnea, chest tightness, shortness of breath and wheezing  Cardiovascular: Negative  Negative for chest pain, palpitations and leg swelling  Gastrointestinal: Negative  Negative for abdominal distention, abdominal pain, constipation, diarrhea and nausea  Genitourinary: Negative  Negative for difficulty urinating, dysuria and frequency  Objective:  Vitals:    07/15/20 1721   BP: 132/78   Pulse: (!) 53   Temp: (!) 97 4 °F (36 3 °C)   Weight: 118 kg (259 lb 12 8 oz)   Height: 5' 9 5" (1 765 m)     BP Readings from Last 6 Encounters:   07/15/20 132/78   04/11/19 138/76   03/27/19 170/91   03/23/19 (!) 171/81   02/20/19 146/70      Wt Readings from Last 6 Encounters:   07/15/20 118 kg (259 lb 12 8 oz)   03/31/20 111 kg (245 lb)   04/11/19 118 kg (260 lb)   03/23/19 111 kg (245 lb)             Physical Exam   Constitutional: He is oriented to person, place, and time  He appears well-developed and well-nourished  No distress  HENT:   Head: Normocephalic and atraumatic  Right Ear: External ear normal    Left Ear: External ear normal    Nose: Nose normal    Mouth/Throat: Uvula is midline, oropharynx is clear and moist and mucous membranes are normal    Eyes: Pupils are equal, round, and reactive to light   Conjunctivae and EOM are normal    Neck: Normal range of motion  Neck supple  Carotid bruit is not present  No thyroid mass and no thyromegaly present  18 5 inch neck circumfernec  Cardiovascular: Normal rate, regular rhythm and normal heart sounds  Exam reveals no gallop and no friction rub  No murmur heard  Pulmonary/Chest: Effort normal and breath sounds normal  No respiratory distress  He has no wheezes  He has no rales  He exhibits no tenderness  Abdominal: Soft  Bowel sounds are normal  He exhibits no distension and no mass  There is no tenderness  There is no rebound and no guarding  Musculoskeletal: Normal range of motion  Right shoulder: Normal         Right elbow: Normal        Cervical back: Normal         Right upper arm: He exhibits tenderness  He exhibits no bony tenderness, no swelling, no edema, no deformity and no laceration  Arms:  Neurological: He is alert and oriented to person, place, and time  Skin: Skin is warm  Psychiatric: He has a normal mood and affect  His behavior is normal  Judgment and thought content normal    Nursing note and vitals reviewed  No visits with results within 6 Month(s) from this visit     Latest known visit with results is:   Admission on 03/23/2019, Discharged on 03/23/2019   Component Date Value Ref Range Status    Ventricular Rate 03/23/2019 45  BPM Final    Atrial Rate 03/23/2019 45  BPM Final    PA Interval 03/23/2019 182  ms Final    QRSD Interval 03/23/2019 106  ms Final    QT Interval 03/23/2019 436  ms Final    QTC Interval 03/23/2019 377  ms Final    P Axis 03/23/2019 64  degrees Final    QRS Axis 03/23/2019 31  degrees Final    T Wave [place] 03/23/2019 9  degrees Final

## 2020-07-22 ENCOUNTER — TELEPHONE (OUTPATIENT)
Dept: SLEEP CENTER | Facility: CLINIC | Age: 60
End: 2020-07-22

## 2020-07-22 NOTE — TELEPHONE ENCOUNTER
----- Message from Karolyn Olsen MD sent at 7/21/2020  9:22 PM EDT -----  approved  ----- Message -----  From: Rajat Quinonez: 7/20/2020  10:42 AM EDT  To: Sleep Medicine Olivia Canales, #    PLEASE REVIEW FOR APPROVAL OR DENIAL AND WHY

## 2020-07-24 ENCOUNTER — OFFICE VISIT (OUTPATIENT)
Dept: OBGYN CLINIC | Facility: CLINIC | Age: 60
End: 2020-07-24
Payer: COMMERCIAL

## 2020-07-24 ENCOUNTER — APPOINTMENT (OUTPATIENT)
Dept: RADIOLOGY | Facility: CLINIC | Age: 60
End: 2020-07-24
Payer: COMMERCIAL

## 2020-07-24 VITALS
TEMPERATURE: 97.3 F | DIASTOLIC BLOOD PRESSURE: 82 MMHG | HEART RATE: 54 BPM | SYSTOLIC BLOOD PRESSURE: 132 MMHG | WEIGHT: 255 LBS | HEIGHT: 72 IN | BODY MASS INDEX: 34.54 KG/M2

## 2020-07-24 DIAGNOSIS — M79.621 PAIN IN RIGHT UPPER ARM: ICD-10-CM

## 2020-07-24 DIAGNOSIS — M48.061 NEURAL FORAMINAL STENOSIS OF LUMBAR SPINE: ICD-10-CM

## 2020-07-24 DIAGNOSIS — S46.011A TRAUMATIC TEAR OF RIGHT ROTATOR CUFF, UNSPECIFIED TEAR EXTENT, INITIAL ENCOUNTER: Primary | ICD-10-CM

## 2020-07-24 PROCEDURE — 99213 OFFICE O/P EST LOW 20 MIN: CPT | Performed by: ORTHOPAEDIC SURGERY

## 2020-07-24 PROCEDURE — 3008F BODY MASS INDEX DOCD: CPT | Performed by: ORTHOPAEDIC SURGERY

## 2020-07-24 PROCEDURE — 73030 X-RAY EXAM OF SHOULDER: CPT

## 2020-07-24 PROCEDURE — 1036F TOBACCO NON-USER: CPT | Performed by: ORTHOPAEDIC SURGERY

## 2020-07-24 NOTE — PROGRESS NOTES
Ortho Sports Medicine Shoulder New Patient Visit     Assesment:     62 y o  male right shoulder possible rotator cuff tear    Plan:    Conservative treatment:    Ice to shoulder 1-2 times daily, for 20 minutes at a time  Imaging: All imaging from today was reviewed by myself and explained to the patient  We will obtain an MRI to rule out rotator cuff tear    Injection:    No Injection planned at this time  May consider future corticosteroid injection depending on clinical exam/imaging  Surgery:     No surgery is recommended at this point, continue with conservative treatment plan as noted  Follow up:    Return after MRI, for Recheck  Chief Complaint   Patient presents with    Right Upper Arm - Pain     History of Present Illness: The patient is a 62 y o , right hand dominant male whose occupation is retired, referred to me by their primary care physician, seen in clinic for consultation of right shoulder pain  The patient denies a history of diabetes  The patient denies a history of thyroid disorder  Pain is located anterior  The patient rates the pain as a 6/10  The pain has been present for 9 months  The patient recalls feeling pain in his right shoulder after swatting a fly on October of 2019  The mechanism of injury was swatting a fly  The pain is characterized as immediately burning and now is dull, achy  The pain is present at all times and especially at night  The patient states that this pain wakes him up in the middle of the night  Pain is improved by rest, ice and NSAIDS  Pain is aggravated by overhead activity, reaching back and lifting  Symptoms include clicking, catching and popping  The patient has weakness  The patient denies numbness and tingling  The patient has tried rest, ice and NSAIDS  Shoulder Surgical History:  None    Past Medical, Social and Family History:  History reviewed  No pertinent past medical history    Past Surgical History:   Procedure Laterality Date    KNEE SURGERY       Allergies   Allergen Reactions    Sulfa Antibiotics      itching     Current Outpatient Medications on File Prior to Visit   Medication Sig Dispense Refill    sildenafil (VIAGRA) 100 mg tablet Take 100 mg by mouth daily as needed for erectile dysfunction      tadalafil (CIALIS) 20 MG tablet Take 20 mg by mouth daily as needed for erectile dysfunction       No current facility-administered medications on file prior to visit        Social History     Socioeconomic History    Marital status: Significant Other     Spouse name: Not on file    Number of children: Not on file    Years of education: Not on file    Highest education level: Not on file   Occupational History    Not on file   Social Needs    Financial resource strain: Not on file    Food insecurity:     Worry: Not on file     Inability: Not on file    Transportation needs:     Medical: Not on file     Non-medical: Not on file   Tobacco Use    Smoking status: Former Smoker     Types: Cigarettes    Smokeless tobacco: Never Used   Substance and Sexual Activity    Alcohol use: Yes     Frequency: Monthly or less    Drug use: Never    Sexual activity: Not on file   Lifestyle    Physical activity:     Days per week: Not on file     Minutes per session: Not on file    Stress: Not on file   Relationships    Social connections:     Talks on phone: Not on file     Gets together: Not on file     Attends Holiness service: Not on file     Active member of club or organization: Not on file     Attends meetings of clubs or organizations: Not on file     Relationship status: Not on file    Intimate partner violence:     Fear of current or ex partner: Not on file     Emotionally abused: Not on file     Physically abused: Not on file     Forced sexual activity: Not on file   Other Topics Concern    Not on file   Social History Narrative    Not on file     I have reviewed the past medical, surgical, social and family history, medications and allergies as documented in the EMR  Review of systems: ROS is negative other than that noted in the HPI  Constitutional: Negative for fatigue and fever  HENT: Negative for sore throat  Respiratory: Negative for shortness of breath  Cardiovascular: Negative for chest pain  Gastrointestinal: Negative for abdominal pain  Endocrine: Negative for cold intolerance and heat intolerance  Genitourinary: Negative for flank pain  Musculoskeletal: Negative for back pain  Skin: Negative for rash  Allergic/Immunologic: Negative for immunocompromised state  Neurological: Negative for dizziness  Psychiatric/Behavioral: Negative for agitation  Physical Exam:    Blood pressure 132/82, pulse (!) 54, temperature (!) 97 3 °F (36 3 °C), height 6' (1 829 m), weight 116 kg (255 lb)      General/Constitutional: NAD, well developed, well nourished  HENT: Normocephalic, atraumatic  CV: Intact distal pulses, regular rate  Resp: No respiratory distress or labored breathing  Lymphatic: No lymphadenopathy palpated  Neuro: Alert and Oriented x 3, no focal deficits  Psych: Normal mood, normal affect, normal judgement, normal behavior  Skin: Warm, dry, no rashes, no erythema    Shoulder focused exam:    RIGHT LEFT    Scapula Atrophy Negative Negative     Winging Negative Negative     Protraction Negative Negative    Rotator cuff SS 4+/5 5/5     IS 5/5 5/5     SubS 5/5 5/5    ROM     170     ER0 60 60     ER90 90    90     IR90 T6    T6     IRb T6    T6    TTP: AC Negative Negative     Biceps Positive Negative     Coracoid Negative Negative    Special Tests: O'Briens Positive Negative     Chirinos-shear Negative Negative     Cross body Adduction Negative Negative     Speeds  Negative Negative     Sofya's Negative Negative     Whipple Positive Negative       Neer Negative Negative     Bermudez Negative Negative    Instability: Apprehension & relocation not tested not tested Load & shift not tested not tested    Other: Crank Negative Negative             UE NV Exam: +2 Radial pulses bilaterally  Sensation intact to light touch C5-T1 bilaterally, Radial/median/ulnar nerve motor intact      Bilateral elbow, wrist, and and forearm ROM full, painless with passive ROM, no ttp or crepitance throughout extremities below shoulder joint    Cervical ROM is full without pain, numbness or tingling      Shoulder Imaging    X-rays of the right shoulder were reviewed, which demonstrate mild glenohumeral joint and moderate AC joint osteoarthritis  I have reviewed the radiology report and do not currently have a radiology reading from  [facility], but will check the result once the reading is performed      Scribe Attestation    I,:   Henrik Husain am acting as a scribe while in the presence of the attending physician :        I,:   Tari Starr, DO personally performed the services described in this documentation    as scribed in my presence :

## 2020-07-28 ENCOUNTER — HOSPITAL ENCOUNTER (EMERGENCY)
Facility: HOSPITAL | Age: 60
Discharge: HOME | End: 2020-07-28
Attending: EMERGENCY MEDICINE | Admitting: EMERGENCY MEDICINE
Payer: COMMERCIAL

## 2020-07-28 ENCOUNTER — APPOINTMENT (EMERGENCY)
Dept: RADIOLOGY | Facility: HOSPITAL | Age: 60
End: 2020-07-28
Payer: COMMERCIAL

## 2020-07-28 VITALS
HEART RATE: 44 BPM | TEMPERATURE: 97.4 F | SYSTOLIC BLOOD PRESSURE: 174 MMHG | RESPIRATION RATE: 19 BRPM | DIASTOLIC BLOOD PRESSURE: 81 MMHG | OXYGEN SATURATION: 94 %

## 2020-07-28 DIAGNOSIS — S93.509A: Primary | ICD-10-CM

## 2020-07-28 PROCEDURE — 99284 EMERGENCY DEPT VISIT MOD MDM: CPT | Performed by: EMERGENCY MEDICINE

## 2020-07-28 PROCEDURE — 99283 EMERGENCY DEPT VISIT LOW MDM: CPT

## 2020-07-28 PROCEDURE — 73630 X-RAY EXAM OF FOOT: CPT

## 2020-07-28 RX ORDER — ACETAMINOPHEN 325 MG/1
650 TABLET ORAL ONCE
Status: COMPLETED | OUTPATIENT
Start: 2020-07-28 | End: 2020-07-28

## 2020-07-28 RX ADMIN — DICLOFENAC SODIUM 2 G: 10 GEL TOPICAL at 18:32

## 2020-07-28 RX ADMIN — ACETAMINOPHEN 650 MG: 325 TABLET ORAL at 18:32

## 2020-07-28 NOTE — ED PROVIDER NOTES
History  Chief Complaint   Patient presents with    Ankle Injury     pt states his flip flop broke and he twisted his left ankle  did not fall     HPI    Patient is a pleasant 62 yom who sustained a left big toe injury when his fliop flop broke  No other injuries  + achi pain in the left big toe, worse with ambulation  Some mild edema  No f/c/s  No head strike  No wound  No radiation of the pain  Neurovascularly intact  [facility] 62 yom, suspect toe sprain, no fracture on xray, will have patient followup with podiatry  Discussed with the patient who agrees with the workup and plan, understands return precautions and followup instructions  Prior to Admission Medications   Prescriptions Last Dose Informant Patient Reported? Taking?   sildenafil (VIAGRA) 100 mg tablet   Yes No   Sig: Take 100 mg by mouth daily as needed for erectile dysfunction   tadalafil (CIALIS) 20 MG tablet   Yes No   Sig: Take 20 mg by mouth daily as needed for erectile dysfunction      Facility-Administered Medications: None       History reviewed  No pertinent past medical history  Past Surgical History:   Procedure Laterality Date    KNEE SURGERY         Family History   Problem Relation Age of Onset    Dementia Mother     Heart disease Father     Pancreatic cancer Brother      I have reviewed and agree with the history as documented  E-Cigarette/Vaping    E-Cigarette Use Never User      E-Cigarette/Vaping Substances     Social History     Tobacco Use    Smoking status: Former Smoker     Types: Cigarettes    Smokeless tobacco: Never Used   Substance Use Topics    Alcohol use: Yes     Frequency: Monthly or less    Drug use: Never       Review of Systems   Musculoskeletal: Positive for arthralgias  All other systems reviewed and are negative  Physical Exam  Physical Exam   Constitutional: He is oriented to person, place, and time  He appears well-developed and well-nourished     HENT: Head: Normocephalic and atraumatic  Eyes: Pupils are equal, round, and reactive to light  EOM are normal    Neck: Normal range of motion  Neck supple  Cardiovascular: Normal rate, regular rhythm and normal heart sounds  No murmur heard  Pulmonary/Chest: Effort normal and breath sounds normal  No respiratory distress  He has no wheezes  Abdominal: Soft  Bowel sounds are normal  He exhibits no distension  There is no tenderness  Musculoskeletal: Normal range of motion  He exhibits edema and tenderness  He exhibits no deformity  Neurological: He is alert and oriented to person, place, and time  Coordination normal    Skin: Skin is warm and dry  He is not diaphoretic  No erythema  Psychiatric: He has a normal mood and affect  His behavior is normal    Nursing note and vitals reviewed  Vital Signs  ED Triage Vitals   Temperature Pulse Respirations Blood Pressure SpO2   07/28/20 1814 07/28/20 1814 07/28/20 1814 07/28/20 1814 07/28/20 1814   (!) 97 4 °F (36 3 °C) (!) 44 19 (!) 174/81 94 %      Temp src Heart Rate Source Patient Position - Orthostatic VS BP Location FiO2 (%)   -- 07/28/20 1814 -- -- --    Monitor         Pain Score       07/28/20 1832       7           Vitals:    07/28/20 1814   BP: (!) 174/81   Pulse: (!) 44         Visual Acuity      ED Medications  Medications   acetaminophen (TYLENOL) tablet 650 mg (650 mg Oral Given 7/28/20 1832)   diclofenac sodium (VOLTAREN) 1 % topical gel 2 g (2 g Topical Given 7/28/20 1832)       Diagnostic Studies  Results Reviewed     None                 XR foot 3+ views LEFT   Final Result by Nehemias Bob MD (07/28 1957)      No acute osseous abnormality  Workstation performed: [id number]                    Procedures  Procedures         ED Course       US AUDIT      Most Recent Value   Initial Alcohol Screen: US AUDIT-C    1  How often do you have a drink containing alcohol?  0 Filed at: 07/28/2020 1813   2   How many drinks containing alcohol do you have on a typical day you are drinking? 0 Filed at: 07/28/2020 1813   3a  Male UNDER 65: How often do you have five or more drinks on one occasion? 0 Filed at: 07/28/2020 1813   3b  FEMALE Any Age, or MALE 65+: How often do you have 4 or more drinks on one occassion? 0 Filed at: 07/28/2020 1813   Audit-C Score  0 Filed at: 07/28/2020 1813                  TETO/DAST-10      Most Recent Value   How many times in the past year have you    Used an illegal drug or used a prescription medication for non-medical reasons? Never Filed at: 07/28/2020 1813                                MDM      Disposition  Final diagnoses: Toe sprain     Time reflects when diagnosis was documented in both MDM as applicable and the Disposition within this note     Time User Action Codes Description Comment    7/28/2020  6:51 PM [address] [place] [address] [J07 [id number]] Toe sprain       ED Disposition     ED Disposition Condition Date/Time Comment    Discharge Stable Tu Jul 28, 2020  6:51 PM Gage Bae discharge to home/self care  Follow-up Information     Follow up With Specialties Details Why Contact Info    Gloria Rangel DPM Podiatry, Wound Care In 2 days As needed if toe continues to hurt [address] [address]  [phone]            Discharge Medication List as of 7/28/2020  6:51 PM      CONTINUE these medications which have NOT CHANGED    Details   sildenafil (VIAGRA) 100 mg tablet Take 100 mg by mouth daily as needed for erectile dysfunction, Historical Med      tadalafil (CIALIS) 20 MG tablet Take 20 mg by mouth daily as needed for erectile dysfunction, Historical Med           No discharge procedures on file      PDMP Review     None          ED Provider  Electronically Signed by           Lin Fung MD  07/29/20 0637

## 2020-07-30 ENCOUNTER — HOSPITAL ENCOUNTER (OUTPATIENT)
Dept: MRI IMAGING | Facility: HOSPITAL | Age: 60
Discharge: HOME | End: 2020-07-30
Attending: ORTHOPAEDIC SURGERY
Payer: COMMERCIAL

## 2020-07-30 DIAGNOSIS — S46.011A TRAUMATIC TEAR OF RIGHT ROTATOR CUFF, UNSPECIFIED TEAR EXTENT, INITIAL ENCOUNTER: ICD-10-CM

## 2020-07-30 PROCEDURE — 73221 MRI JOINT UPR EXTREM W/O DYE: CPT

## 2020-08-04 ENCOUNTER — OFFICE VISIT (OUTPATIENT)
Dept: PODIATRY | Facility: CLINIC | Age: 60
End: 2020-08-04
Payer: COMMERCIAL

## 2020-08-04 VITALS
TEMPERATURE: 98.4 F | DIASTOLIC BLOOD PRESSURE: 90 MMHG | BODY MASS INDEX: 32.08 KG/M2 | SYSTOLIC BLOOD PRESSURE: 150 MMHG | HEIGHT: 74 IN | WEIGHT: 250 LBS | HEART RATE: 98 BPM

## 2020-08-04 DIAGNOSIS — S93.529A METATARSOPHALANGEAL (JOINT) SPRAIN, INITIAL ENCOUNTER: Primary | ICD-10-CM

## 2020-08-04 DIAGNOSIS — T14.8XXA JOINT CAPSULE TEAR: ICD-10-CM

## 2020-08-04 PROCEDURE — 99203 OFFICE O/P NEW LOW 30 MIN: CPT | Performed by: PODIATRIST

## 2020-08-04 PROCEDURE — 1036F TOBACCO NON-USER: CPT | Performed by: PODIATRIST

## 2020-08-04 PROCEDURE — 3008F BODY MASS INDEX DOCD: CPT | Performed by: PODIATRIST

## 2020-08-04 NOTE — PROGRESS NOTES
Assessment/Plan:     Diagnoses and all orders for this visit:    Metatarsophalangeal (joint) sprain, initial encounter  -     Cam Boot    Joint capsule tear  -     Cam Boot       Diagnosis and options discussed  Patient hyperextended the first MTPJ on his left foot  The toe is not unstable and is in anatomic alignment  I did recommend 2-4 weeks of CAM boot, provided prescription  Xray reviewed, no osseous fracture noted  Educated on RICE protocol  Stressed minimum 2 weeks of boot  Check in 4 weeks, Re-Xray if pain persists to rule out stress fx  Subjective:      Patient ID: Marc Bernard is a 62 y o  male  Patient tripped and hyperextended his left great toe last week  He went to the ED and got XRays  Nothing is fractured  The toe is about a 7-8 out of ten  HE is wearing flip flops  The pain is on the side of the big toe and it is swollen  He is otherwise healthy  The following portions of the patient's history were reviewed and updated as appropriate: allergies, current medications, past family history, past medical history, past social history, past surgical history and problem list     Review of Systems   Constitutional: Negative  HENT: Negative for rhinorrhea, sinus pressure and sinus pain  Respiratory: Negative for cough and shortness of breath  Cardiovascular: Negative for leg swelling  Musculoskeletal: Positive for arthralgias and joint swelling  Negative for back pain, gait problem and myalgias  Allergic/Immunologic: Negative for immunocompromised state  Neurological: Negative for weakness and numbness  Psychiatric/Behavioral: Negative for agitation  Objective:      /90   Pulse 98   Temp 98 4 °F (36 9 °C)   Ht 6' 2"   Wt 113 kg (250 lb)   BMI 32 10 kg/m²          Physical Exam    Vitals reviewed    Constitutional: Patient is not distressed  Patient is well developed  Vascular: Dorsalis pedis and posterior tibial pulses palpable   Capillary refill time within normal limits to all digits  No erythema  No edema  No significant varicosities  Dermatology: No rash  No open lesions  Present pedal hair  Skin has healthy turgor  Musculoskeletal: Normal range of motion to ankle, subtalar joint, and midtarsal joint  Normal range of motion first MTPJ  Manual muscle testing 5 out of 5 for inversion/eversion/dorsiflexion/plantarflexion  On stance patients feet are generally rectus  Tenderness firs tMTPJ left without instability  Negative lachman test    EHL and FHL left foot intact, weaknees due to pain with stress to FHL  Medial MTPJ capsule most tender  Neurological exam: Monofilament sensation is intact  Vibratory sensation is intact  Achilles reflex is normal  Patient is AAOx3     Saphenous nerve sensation: normal  Tibial nerve sensation: normal  Superficial peroneal nerve sensation: normal  Deep peroneal nerve sensation: normal  Sural nerve sensation: normal    XRay taken in ED reviewed, no evidence of fracture or acute trauma

## 2020-08-05 ENCOUNTER — OFFICE VISIT (OUTPATIENT)
Dept: OBGYN CLINIC | Facility: CLINIC | Age: 60
End: 2020-08-05
Payer: COMMERCIAL

## 2020-08-05 VITALS — HEIGHT: 72 IN | BODY MASS INDEX: 33.86 KG/M2 | WEIGHT: 250 LBS | TEMPERATURE: 97.4 F

## 2020-08-05 DIAGNOSIS — S46.011A TRAUMATIC TEAR OF RIGHT ROTATOR CUFF, UNSPECIFIED TEAR EXTENT, INITIAL ENCOUNTER: ICD-10-CM

## 2020-08-05 DIAGNOSIS — Z01.812 PRE-OPERATIVE LABORATORY EXAMINATION: Primary | ICD-10-CM

## 2020-08-05 DIAGNOSIS — M75.121 NONTRAUMATIC COMPLETE TEAR OF RIGHT ROTATOR CUFF: Primary | ICD-10-CM

## 2020-08-05 DIAGNOSIS — S46.211A BICEPS MUSCLE TEAR, RIGHT, INITIAL ENCOUNTER: ICD-10-CM

## 2020-08-05 DIAGNOSIS — M47.816 FACET ARTHRITIS OF LUMBAR REGION: ICD-10-CM

## 2020-08-05 PROCEDURE — 99214 OFFICE O/P EST MOD 30 MIN: CPT | Performed by: ORTHOPAEDIC SURGERY

## 2020-08-05 PROCEDURE — 1036F TOBACCO NON-USER: CPT | Performed by: ORTHOPAEDIC SURGERY

## 2020-08-05 PROCEDURE — 3008F BODY MASS INDEX DOCD: CPT | Performed by: ORTHOPAEDIC SURGERY

## 2020-08-05 RX ORDER — TRAMADOL HYDROCHLORIDE 50 MG/1
50 TABLET ORAL EVERY 6 HOURS SCHEDULED
Status: CANCELLED | OUTPATIENT
Start: 2020-08-05

## 2020-08-05 RX ORDER — ACETAMINOPHEN 325 MG/1
650 TABLET ORAL EVERY 6 HOURS PRN
Status: CANCELLED | OUTPATIENT
Start: 2020-08-05

## 2020-08-05 NOTE — PROGRESS NOTES
Ortho Sports Medicine Shoulder Follow Up Visit     Assesment:   62 y o  male right shoulder rotator cuff and long head of biceps tear with no visible deformity or cramping in the biceps    Plan:    Conservative treatment:    Ice to shoulder 1-2 times daily, for 20 minutes at a time  Post-op Pt written  Post-op Sling written      Imaging: All imaging from today was reviewed by myself and explained to the patient  Injection:    No Injection planned at this time  Surgery: All of the risks and benefits of operative treatment were explained to the patient, as well as the risks and benefits of any alternative treatment options, including nonoperative care  This risks of surgery include, but are not limited to, infection, bleeding, blood clot, damage to nerves/arteries, need for further surgyer, cardiovascular risk, anesthesia risk, and continued pain  The patient understood this and elects to proceed forward with surgical intervention  We will proceed forward with surgical arthroscopy of the shoulder with rotator cuff repair and possible but unlikely arthroscopic bicep tenodesis vs tenotomy and subacromial decompression  Follow up:    Return for 1 week post-op  Chief Complaint   Patient presents with    Right Shoulder - Follow-up         History of Present Illness: The patient is returns for follow up of MRI of the right shoulder  Since the prior visit, He reports no improvement  He is still having difficulty with ROM and having weakness  Pain is improved by rest, ice and NSAIDS  Pain is aggravated by overhead activity, reaching back and lifting   Symptoms include clicking, catching and popping  The patient has weakness  The patient has tried rest, ice and NSAIDS  Shoulder Surgical History:  None    Past Medical, Social and Family History:  History reviewed  No pertinent past medical history    Past Surgical History:   Procedure Laterality Date    KNEE SURGERY       Allergies   Allergen Reactions    Sulfa Antibiotics      itching     Current Outpatient Medications on File Prior to Visit   Medication Sig Dispense Refill    sildenafil (VIAGRA) 100 mg tablet Take 100 mg by mouth daily as needed for erectile dysfunction      tadalafil (CIALIS) 20 MG tablet Take 20 mg by mouth daily as needed for erectile dysfunction       No current facility-administered medications on file prior to visit        Social History     Socioeconomic History    Marital status: Significant Other     Spouse name: Not on file    Number of children: Not on file    Years of education: Not on file    Highest education level: Not on file   Occupational History    Not on file   Social Needs    Financial resource strain: Not on file    Food insecurity     Worry: Not on file     Inability: Not on file    Transportation needs     Medical: Not on file     Non-medical: Not on file   Tobacco Use    Smoking status: Former Smoker     Types: Cigarettes    Smokeless tobacco: Never Used   Substance and Sexual Activity    Alcohol use: Yes     Frequency: Monthly or less    Drug use: Never    Sexual activity: Not on file   Lifestyle    Physical activity     Days per week: Not on file     Minutes per session: Not on file    Stress: Not on file   Relationships    Social connections     Talks on phone: Not on file     Gets together: Not on file     Attends Holiness service: Not on file     Active member of club or organization: Not on file     Attends meetings of clubs or organizations: Not on file     Relationship status: Not on file    Intimate partner violence     Fear of current or ex partner: Not on file     Emotionally abused: Not on file     Physically abused: Not on file     Forced sexual activity: Not on file   Other Topics Concern    Not on file   Social History Narrative    Not on file       I have reviewed the past medical, surgical, social and family history, medications and allergies as documented in the EMR  Review of systems: ROS is negative other than that noted in the HPI  Constitutional: Negative for fatigue and fever  Physical Exam:    Temperature (!) 97 4 °F (36 3 °C), height 6', weight 113 kg (250 lb)  General/Constitutional: NAD, well developed, well nourished  HENT: Normocephalic, atraumatic  CV: Intact distal pulses, regular rate  Resp: No respiratory distress or labored breathing  Lymphatic: No lymphadenopathy palpated  Neuro: Alert and Oriented x 3, no focal deficits  Psych: Normal mood, normal affect, normal judgement, normal behavior  Skin: Warm, dry, no rashes, no erythema      Shoulder focused exam:       RIGHT LEFT    Scapula Atrophy Negative Negative     Winging Negative Negative     Protraction Negative Negative    Rotator cuff SS 4+/5 5/5     IS 5/5 5/5     SubS 5/5 5/5    ROM     170     ER0 60 60     ER90 90    90     IR90 T6    T6     IRb T6    T6    TTP: AC Negative Negative     Biceps Positive Negative     Coracoid Negative Negative    Special Tests: O'Briens Negative Negative     Chirinos-shear Negative Negative     Cross body Adduction Negative Negative     Speeds  Negative Negative     Sofya's Negative Negative     Whipple Positive Negative       Neer Negative Negative     Bermudez Negative Negative    Instability: Apprehension & relocation not tested not tested     Load & shift not tested not tested    Other: Crank Negative Negative               UE NV Exam: +2 Radial pulses bilaterally  Sensation intact to light touch C5-T1 bilaterally, Radial/median/ulnar nerve motor intact    Cervical ROM is full without pain, numbness or tingling      Shoulder Imaging    MRI of the right shoulder was reviewed and demonstrates a complete supraspinatus tendon repair with retraction at the level of the humeral head with no fatty atrophy visualized  There is also a complete tear of the long head of the biceps tendon    I reviewed the radiologist's report agree with their impression

## 2020-08-05 NOTE — H&P (VIEW-ONLY)
Ortho Sports Medicine Shoulder Follow Up Visit     Assesment:   62 y o  male right shoulder rotator cuff and long head of biceps tear with no visible deformity or cramping in the biceps    Plan:    Conservative treatment:    Ice to shoulder 1-2 times daily, for 20 minutes at a time  Post-op Pt written  Post-op Sling written      Imaging: All imaging from today was reviewed by myself and explained to the patient  Injection:    No Injection planned at this time  Surgery: All of the risks and benefits of operative treatment were explained to the patient, as well as the risks and benefits of any alternative treatment options, including nonoperative care  This risks of surgery include, but are not limited to, infection, bleeding, blood clot, damage to nerves/arteries, need for further surgyer, cardiovascular risk, anesthesia risk, and continued pain  The patient understood this and elects to proceed forward with surgical intervention  We will proceed forward with surgical arthroscopy of the shoulder with rotator cuff repair and possible but unlikely arthroscopic bicep tenodesis vs tenotomy and subacromial decompression  Follow up:    Return for 1 week post-op  Chief Complaint   Patient presents with    Right Shoulder - Follow-up         History of Present Illness: The patient is returns for follow up of MRI of the right shoulder  Since the prior visit, He reports no improvement  He is still having difficulty with ROM and having weakness  Pain is improved by rest, ice and NSAIDS  Pain is aggravated by overhead activity, reaching back and lifting   Symptoms include clicking, catching and popping  The patient has weakness  The patient has tried rest, ice and NSAIDS  Shoulder Surgical History:  None    Past Medical, Social and Family History:  History reviewed  No pertinent past medical history    Past Surgical History:   Procedure Laterality Date    KNEE SURGERY       Allergies   Allergen Reactions    Sulfa Antibiotics      itching     Current Outpatient Medications on File Prior to Visit   Medication Sig Dispense Refill    sildenafil (VIAGRA) 100 mg tablet Take 100 mg by mouth daily as needed for erectile dysfunction      tadalafil (CIALIS) 20 MG tablet Take 20 mg by mouth daily as needed for erectile dysfunction       No current facility-administered medications on file prior to visit        Social History     Socioeconomic History    Marital status: Significant Other     Spouse name: Not on file    Number of children: Not on file    Years of education: Not on file    Highest education level: Not on file   Occupational History    Not on file   Social Needs    Financial resource strain: Not on file    Food insecurity     Worry: Not on file     Inability: Not on file    Transportation needs     Medical: Not on file     Non-medical: Not on file   Tobacco Use    Smoking status: Former Smoker     Types: Cigarettes    Smokeless tobacco: Never Used   Substance and Sexual Activity    Alcohol use: Yes     Frequency: Monthly or less    Drug use: Never    Sexual activity: Not on file   Lifestyle    Physical activity     Days per week: Not on file     Minutes per session: Not on file    Stress: Not on file   Relationships    Social connections     Talks on phone: Not on file     Gets together: Not on file     Attends Taoism service: Not on file     Active member of club or organization: Not on file     Attends meetings of clubs or organizations: Not on file     Relationship status: Not on file    Intimate partner violence     Fear of current or ex partner: Not on file     Emotionally abused: Not on file     Physically abused: Not on file     Forced sexual activity: Not on file   Other Topics Concern    Not on file   Social History Narrative    Not on file       I have reviewed the past medical, surgical, social and family history, medications and allergies as documented in the EMR  Review of systems: ROS is negative other than that noted in the HPI  Constitutional: Negative for fatigue and fever  Physical Exam:    Temperature (!) 97 4 °F (36 3 °C), height 6', weight 113 kg (250 lb)  General/Constitutional: NAD, well developed, well nourished  HENT: Normocephalic, atraumatic  CV: Intact distal pulses, regular rate  Resp: No respiratory distress or labored breathing  Lymphatic: No lymphadenopathy palpated  Neuro: Alert and Oriented x 3, no focal deficits  Psych: Normal mood, normal affect, normal judgement, normal behavior  Skin: Warm, dry, no rashes, no erythema      Shoulder focused exam:       RIGHT LEFT    Scapula Atrophy Negative Negative     Winging Negative Negative     Protraction Negative Negative    Rotator cuff SS 4+/5 5/5     IS 5/5 5/5     SubS 5/5 5/5    ROM     170     ER0 60 60     ER90 90    90     IR90 T6    T6     IRb T6    T6    TTP: AC Negative Negative     Biceps Positive Negative     Coracoid Negative Negative    Special Tests: O'Briens Negative Negative     Chirinos-shear Negative Negative     Cross body Adduction Negative Negative     Speeds  Negative Negative     Sofya's Negative Negative     Whipple Positive Negative       Neer Negative Negative     Bermudez Negative Negative    Instability: Apprehension & relocation not tested not tested     Load & shift not tested not tested    Other: Crank Negative Negative               UE NV Exam: +2 Radial pulses bilaterally  Sensation intact to light touch C5-T1 bilaterally, Radial/median/ulnar nerve motor intact    Cervical ROM is full without pain, numbness or tingling      Shoulder Imaging    MRI of the right shoulder was reviewed and demonstrates a complete supraspinatus tendon repair with retraction at the level of the humeral head with no fatty atrophy visualized  There is also a complete tear of the long head of the biceps tendon    I reviewed the radiologist's report agree with their impression

## 2020-08-20 NOTE — PRE-PROCEDURE INSTRUCTIONS
Pre-Surgery Instructions:   Medication Instructions    sildenafil (VIAGRA) 100 mg tablet Instructed patient per Anesthesia Guidelines   tadalafil (CIALIS) 20 MG tablet Instructed patient per Anesthesia Guidelines  Pre-op Showering Instructions for Surgery Patients    Before your operation, you play an important role in decreasing your risk for infection by washing with special antiseptic soap  This is an effective way to reduce bacteria on the skin which may help to prevent infections at the surgical site  Please read the following directions in advance  1  In the week before your operation, purchase a 4 ounce bottle of antiseptic soap containing chlorhexidine gluconate (CHG)  4%  Some brand names include: Aplicare®, Endure, and Hibiclens®  The cost is usually less than $5 00   For your convenience, the [facility] carries the soap   It may also be available at your doctors office or pre-admission testing center, and at most retail pharmacies   If you are allergic or sensitive to soaps containing CHG, please let your doctor know so another antiseptic can be suggested   CHG antiseptic soap is for external use only  2  The day before your operation, follow these instructions carefully to get ready   Please clean linens (sheets) on your bed; you should sleep on clean sheets after your evening shower   Get clean towels and washcloth ready  you need enough for 2 showers   Set aside clean underwear, pajamas, and clothing to wear after the showers     Reminders:   DO NOT use any other soap or body rinse on your skin during or after the antiseptic showers   DO NOT use lotion, powder, deodorant, or perfume/aftershave of any kind on your skin after your antiseptic shower   DO NOT shave any body parts in the 24 hours/day before your operation   DO NOT get the antiseptic soap in your eyes, ears, nose, mouth, or vaginal area    3   You will need to shower the night before AND the morning of your surgery  Shower 1:   The first evening before the operation, take the first shower   First, shampoo your hair with regular shampoo and rinse it completely before you use the antiseptic soap  After washing and rinsing your hair, rinse your body   Next, use a clean washcloth to apply the antiseptic soap and wash your body from the neck down to your toes using ½ bottle of the antiseptic soap  You will use the other ½ bottle for the second shower   Clean the area where your incision will be; lather this area well for about 2 minutes   If you are having head or neck surgery, wash areas with the antiseptic soap   Rinse yourself completely with running water   Use a clean towel to dry off   Wear clean underwear and clothing/pajamas  Shower 2   The morning of your operation, take the second shower following the same steps as Shower 1 using the second ½ of the bottle of antiseptic soap   Use clean cloths and towels to wash and dry yourself   Wear clean underwear and clothing    You will receive a phone call from hospital for arrival time  Please call surgeons office if any changes in your condition  Wear easy on/off clothing; consider type of surgery;  valuables and jewelry please keep at home  **COVID-19  education done  Please: No contacts or eye make up or artificial eyelashes    Please bring special ordered sling or braces if needed for  Your particular surgery  Will bring immobilizer  Please secure transportation     Follow pre surgery showering or cleaning instructions as  Reviewed by nurse or surgeons office      Questions answered and concerns addressed

## 2020-08-21 ENCOUNTER — APPOINTMENT (OUTPATIENT)
Dept: RADIOLOGY | Facility: CLINIC | Age: 60
End: 2020-08-21
Payer: COMMERCIAL

## 2020-08-21 ENCOUNTER — OFFICE VISIT (OUTPATIENT)
Dept: LAB | Facility: HOSPITAL | Age: 60
End: 2020-08-21
Attending: ORTHOPAEDIC SURGERY
Payer: COMMERCIAL

## 2020-08-21 ENCOUNTER — OFFICE VISIT (OUTPATIENT)
Dept: OBGYN CLINIC | Facility: CLINIC | Age: 60
End: 2020-08-21
Payer: COMMERCIAL

## 2020-08-21 ENCOUNTER — APPOINTMENT (OUTPATIENT)
Dept: LAB | Facility: HOSPITAL | Age: 60
End: 2020-08-21
Payer: COMMERCIAL

## 2020-08-21 VITALS
DIASTOLIC BLOOD PRESSURE: 86 MMHG | BODY MASS INDEX: 33.86 KG/M2 | HEIGHT: 72 IN | SYSTOLIC BLOOD PRESSURE: 140 MMHG | HEART RATE: 63 BPM | TEMPERATURE: 97.5 F | WEIGHT: 250 LBS

## 2020-08-21 DIAGNOSIS — E78.2 MIXED DYSLIPIDEMIA: ICD-10-CM

## 2020-08-21 DIAGNOSIS — M70.52 BURSITIS OF OTHER BURSA OF LEFT KNEE: Primary | ICD-10-CM

## 2020-08-21 DIAGNOSIS — M25.562 LEFT KNEE PAIN, UNSPECIFIED CHRONICITY: ICD-10-CM

## 2020-08-21 DIAGNOSIS — N52.8 OTHER MALE ERECTILE DYSFUNCTION: ICD-10-CM

## 2020-08-21 DIAGNOSIS — Z01.812 PRE-OPERATIVE LABORATORY EXAMINATION: ICD-10-CM

## 2020-08-21 DIAGNOSIS — Z13.1 SCREENING FOR DIABETES MELLITUS (DM): ICD-10-CM

## 2020-08-21 DIAGNOSIS — Z12.5 SCREENING PSA (PROSTATE SPECIFIC ANTIGEN): ICD-10-CM

## 2020-08-21 PROCEDURE — 99214 OFFICE O/P EST MOD 30 MIN: CPT | Performed by: ORTHOPAEDIC SURGERY

## 2020-08-21 PROCEDURE — 1036F TOBACCO NON-USER: CPT | Performed by: ORTHOPAEDIC SURGERY

## 2020-08-21 PROCEDURE — 73562 X-RAY EXAM OF KNEE 3: CPT

## 2020-08-21 PROCEDURE — 93005 ELECTROCARDIOGRAM TRACING: CPT

## 2020-08-21 PROCEDURE — 3008F BODY MASS INDEX DOCD: CPT | Performed by: ORTHOPAEDIC SURGERY

## 2020-08-21 PROCEDURE — 73564 X-RAY EXAM KNEE 4 OR MORE: CPT

## 2020-08-21 NOTE — PROGRESS NOTES
[facility] Knee New Patient Visit     Assesment:   62 y o  male left knee bursitis over tibial tubercle     Plan:    Conservative treatment:    Ice to knee for 20 minutes at least 1-2 times daily  OTC NSAIDS prn for pain, recommended Aleve for a longer duration of relief, every 12 hr prn  Voltaren Gel sent to pharmacy     Imaging: All imaging from today was reviewed by myself and explained to the patient  Injection:    No Injection planned at this time  Surgery:     No surgery is recommended at this point, continue with conservative treatment plan as noted  Follow up:    Return if symptoms worsen or fail to improve  Patient will be follow-up in the office 2 weeks for s/p rotator cuff repair  This appointment we will re-evaluate the left knee and can consider an CSI  Chief Complaint   Patient presents with    Left Knee - Pain       History of Present Illness: The patient is a 62 y o  male who I am currently treating a rotator cuff tear  He presents to the office today for consultation of left knee pain  Pain is located anterior  The patient rates the pain as a 8/10  The pain has been present for 3 days  The patient denies injury  Patient states that he ambulates with left lower extremity the Cam boot due to an injury being treated by Podiatry  Patient states that about 3 days ago he woke up experiencing a sharp intense pain in the anterior aspect of the knee  Patient states that pain is so severe he has difficulty extending and flexing the knee due to pain  He also gets a clicking and catching sensation  At times she will feel as though the leg is buckling  He is walking with a limp due to pain in this knee  He denies any treatment to this area for this pain  He will wake up with pain at night here  The pain is characterized as sharp  The pain is present at all times        Pain is improved by rest   Pain is aggravated by stairs, squatting, weight bearing, walking, standing and knee flexion  Symptoms include clicking, catching and buckling  The patient has tried rest and ice  Knee Surgical History:  Left knee menisectomy with [facility] in 2002 by Dr Faustina Case    Past Medical, Social and Family History:  History reviewed  No pertinent past medical history  Past Surgical History:   Procedure Laterality Date    KNEE SURGERY       Allergies   Allergen Reactions    Penicillins Itching     " a really long time ago"    Sulfa Antibiotics      itching     Current Outpatient Medications on File Prior to Visit   Medication Sig Dispense Refill    Multiple Vitamins-Minerals (CENTRUM SILVER PO) Take by mouth      sildenafil (VIAGRA) 100 mg tablet Take 100 mg by mouth daily as needed for erectile dysfunction      tadalafil (CIALIS) 20 MG tablet Take 20 mg by mouth daily as needed for erectile dysfunction       No current facility-administered medications on file prior to visit        Social History     Socioeconomic History    Marital status: Significant Other     Spouse name: Not on file    Number of children: Not on file    Years of education: Not on file    Highest education level: Not on file   Occupational History    Not on file   Social Needs    Financial resource strain: Not on file    Food insecurity     Worry: Not on file     Inability: Not on file    Transportation needs     Medical: Not on file     Non-medical: Not on file   Tobacco Use    Smoking status: Former Smoker     Types: Cigarettes    Smokeless tobacco: Never Used    Tobacco comment: quit  25 yrs ago   Substance and Sexual Activity    Alcohol use: Yes     Frequency: Monthly or less     Comment: occassional    Drug use: Never    Sexual activity: Not on file   Lifestyle    Physical activity     Days per week: Not on file     Minutes per session: Not on file    Stress: Not on file   Relationships    Social connections     Talks on phone: Not on file     Gets together: Not on file     Attends [employer] service: Not on file     Active member of club or organization: Not on file     Attends meetings of clubs or organizations: Not on file     Relationship status: Not on file    Intimate partner violence     Fear of current or ex partner: Not on file     Emotionally abused: Not on file     Physically abused: Not on file     Forced sexual activity: Not on file   Other Topics Concern    Not on file   Social History Narrative    Not on file         I have reviewed the past medical, surgical, social and family history, medications and allergies as documented in the EMR  Review of systems: ROS is negative other than that noted in the HPI  Constitutional: Negative for fatigue and fever  HENT: Negative for sore throat  Respiratory: Negative for shortness of breath  Cardiovascular: Negative for chest pain  Gastrointestinal: Negative for abdominal pain  Endocrine: Negative for cold intolerance and heat intolerance  Genitourinary: Negative for flank pain  Musculoskeletal: Negative for back pain  Skin: Negative for rash  Allergic/Immunologic: Negative for immunocompromised state  Neurological: Negative for dizziness  Psychiatric/Behavioral: Negative for agitation  Physical Exam:    Blood pressure 140/86, pulse 63, temperature 97 5 °F (36 4 °C), height 6' (1 829 m), weight 113 kg (250 lb)  General/Constitutional: NAD, well developed, well nourished  HENT: Normocephalic, atraumatic  CV: Intact distal pulses, regular rate  Resp: No respiratory distress or labored breathing  Lymphatic: No lymphadenopathy palpated  Neuro: Alert and Oriented x 3, no focal deficits  Psych: Normal mood, normal affect, normal judgement, normal behavior  Skin: Warm, dry, no rashes, no erythema      Knee Exam (focused):                 RIGHT LEFT   ROM:   0-130 0-130   Palpation: Effusion negative small     MJL tenderness Negative Negative     LJL tenderness Negative Negative Meniscus: Julio Negative Negative    Apley's Compression Negative Negative   Instability: Varus stable stable     Valgus stable stable   Special Tests: Lachman Negative Negative     Posterior drawer Negative Negative     Anterior drawer Negative Negative     Pivot shift not tested not tested     Dial not tested not tested   Patella: Palpation no tenderness ttp tibial tubercle      Mobility 1/4 1/4     Apprehension Negative Negative   Other: Single leg 1/4 squat not tested not tested      LE NV Exam: +2 DP/PT pulses bilaterally  Sensation intact to light touch L2-S1 bilaterally     Bilateral hip ROM demonstrates no pain actively or passively    No calf tenderness to palpation bilaterally    Knee Imaging    X-rays of the left knee were reviewed, which demonstrate mild patellofemoral OA  I have reviewed the radiology report and do not currently have a radiology reading from Soraya Gonzalez, but will check the result once the reading is performed

## 2020-08-22 LAB
ATRIAL RATE: 50 BPM
P AXIS: 26 DEGREES
PR INTERVAL: 196 MS
QRS AXIS: -46 DEGREES
QRSD INTERVAL: 148 MS
QT INTERVAL: 468 MS
QTC INTERVAL: 426 MS
T WAVE AXIS: 32 DEGREES
VENTRICULAR RATE: 50 BPM

## 2020-08-22 PROCEDURE — 93010 ELECTROCARDIOGRAM REPORT: CPT

## 2020-08-25 ENCOUNTER — ANESTHESIA EVENT (OUTPATIENT)
Dept: PERIOP | Facility: HOSPITAL | Age: 60
End: 2020-08-25
Payer: COMMERCIAL

## 2020-08-26 ENCOUNTER — ANESTHESIA (OUTPATIENT)
Dept: PERIOP | Facility: HOSPITAL | Age: 60
End: 2020-08-26
Payer: COMMERCIAL

## 2020-08-26 ENCOUNTER — HOSPITAL ENCOUNTER (OUTPATIENT)
Facility: HOSPITAL | Age: 60
Setting detail: OUTPATIENT SURGERY
Discharge: HOME | End: 2020-08-26
Attending: ORTHOPAEDIC SURGERY | Admitting: ORTHOPAEDIC SURGERY
Payer: COMMERCIAL

## 2020-08-26 VITALS
RESPIRATION RATE: 18 BRPM | DIASTOLIC BLOOD PRESSURE: 77 MMHG | SYSTOLIC BLOOD PRESSURE: 147 MMHG | OXYGEN SATURATION: 95 % | WEIGHT: 260.38 LBS | BODY MASS INDEX: 35.27 KG/M2 | HEART RATE: 50 BPM | TEMPERATURE: 97.8 F | HEIGHT: 72 IN

## 2020-08-26 DIAGNOSIS — Z98.890 S/P ROTATOR CUFF REPAIR: Primary | ICD-10-CM

## 2020-08-26 PROCEDURE — C1713 ANCHOR/SCREW BN/BN,TIS/BN: HCPCS | Performed by: ORTHOPAEDIC SURGERY

## 2020-08-26 PROCEDURE — 29827 SHO ARTHRS SRG RT8TR CUF RPR: CPT | Performed by: PHYSICIAN ASSISTANT

## 2020-08-26 PROCEDURE — 29827 SHO ARTHRS SRG RT8TR CUF RPR: CPT | Performed by: ORTHOPAEDIC SURGERY

## 2020-08-26 DEVICE — BIO-COMP SWVLK C, CLD 4.75X19.1MM
Type: IMPLANTABLE DEVICE | Site: SHOULDER | Status: FUNCTIONAL
Brand: ARTHREX®

## 2020-08-26 DEVICE — SPEEDBRG IMP SYS W/BIO-COMP SWVLK
Type: IMPLANTABLE DEVICE | Site: SHOULDER | Status: FUNCTIONAL
Brand: ARTHREX®

## 2020-08-26 RX ORDER — MIDAZOLAM HYDROCHLORIDE 2 MG/2ML
INJECTION, SOLUTION INTRAMUSCULAR; INTRAVENOUS
Status: COMPLETED | OUTPATIENT
Start: 2020-08-26 | End: 2020-08-26

## 2020-08-26 RX ORDER — METOCLOPRAMIDE HYDROCHLORIDE 5 MG/ML
INJECTION INTRAMUSCULAR; INTRAVENOUS AS NEEDED
Status: DISCONTINUED | OUTPATIENT
Start: 2020-08-26 | End: 2020-08-26

## 2020-08-26 RX ORDER — ROPIVACAINE HYDROCHLORIDE 5 MG/ML
INJECTION, SOLUTION EPIDURAL; INFILTRATION; PERINEURAL
Status: COMPLETED | OUTPATIENT
Start: 2020-08-26 | End: 2020-08-26

## 2020-08-26 RX ORDER — GLYCOPYRROLATE 0.2 MG/ML
INJECTION INTRAMUSCULAR; INTRAVENOUS AS NEEDED
Status: DISCONTINUED | OUTPATIENT
Start: 2020-08-26 | End: 2020-08-26

## 2020-08-26 RX ORDER — CEFAZOLIN SODIUM 2 G/50ML
2000 SOLUTION INTRAVENOUS ONCE
Status: DISCONTINUED | OUTPATIENT
Start: 2020-08-26 | End: 2020-08-26 | Stop reason: SDUPTHER

## 2020-08-26 RX ORDER — LIDOCAINE HYDROCHLORIDE 10 MG/ML
INJECTION, SOLUTION EPIDURAL; INFILTRATION; INTRACAUDAL; PERINEURAL
Status: COMPLETED | OUTPATIENT
Start: 2020-08-26 | End: 2020-08-26

## 2020-08-26 RX ORDER — METOCLOPRAMIDE HYDROCHLORIDE 5 MG/ML
10 INJECTION INTRAMUSCULAR; INTRAVENOUS ONCE
Status: DISCONTINUED | OUTPATIENT
Start: 2020-08-26 | End: 2020-08-26 | Stop reason: HOSPADM

## 2020-08-26 RX ORDER — LIDOCAINE HYDROCHLORIDE 10 MG/ML
INJECTION, SOLUTION EPIDURAL; INFILTRATION; INTRACAUDAL; PERINEURAL AS NEEDED
Status: DISCONTINUED | OUTPATIENT
Start: 2020-08-26 | End: 2020-08-26

## 2020-08-26 RX ORDER — FENTANYL CITRATE/PF 50 MCG/ML
25 SYRINGE (ML) INJECTION
Status: DISCONTINUED | OUTPATIENT
Start: 2020-08-26 | End: 2020-08-26 | Stop reason: HOSPADM

## 2020-08-26 RX ORDER — HYDROMORPHONE HCL/PF 1 MG/ML
0.5 SYRINGE (ML) INJECTION
Status: DISCONTINUED | OUTPATIENT
Start: 2020-08-26 | End: 2020-08-26 | Stop reason: HOSPADM

## 2020-08-26 RX ORDER — ONDANSETRON 2 MG/ML
4 INJECTION INTRAMUSCULAR; INTRAVENOUS ONCE
Status: DISCONTINUED | OUTPATIENT
Start: 2020-08-26 | End: 2020-08-26 | Stop reason: HOSPADM

## 2020-08-26 RX ORDER — ONDANSETRON 2 MG/ML
INJECTION INTRAMUSCULAR; INTRAVENOUS AS NEEDED
Status: DISCONTINUED | OUTPATIENT
Start: 2020-08-26 | End: 2020-08-26

## 2020-08-26 RX ORDER — TRAMADOL HYDROCHLORIDE 50 MG/1
50 TABLET ORAL EVERY 6 HOURS SCHEDULED
Status: DISCONTINUED | OUTPATIENT
Start: 2020-08-26 | End: 2020-08-26 | Stop reason: HOSPADM

## 2020-08-26 RX ORDER — DEXAMETHASONE SODIUM PHOSPHATE 10 MG/ML
INJECTION, SOLUTION INTRAMUSCULAR; INTRAVENOUS AS NEEDED
Status: DISCONTINUED | OUTPATIENT
Start: 2020-08-26 | End: 2020-08-26

## 2020-08-26 RX ORDER — CLINDAMYCIN PHOSPHATE 900 MG/50ML
900 INJECTION INTRAVENOUS ONCE
Status: COMPLETED | OUTPATIENT
Start: 2020-08-26 | End: 2020-08-26

## 2020-08-26 RX ORDER — SODIUM CHLORIDE, SODIUM LACTATE, POTASSIUM CHLORIDE, CALCIUM CHLORIDE 600; 310; 30; 20 MG/100ML; MG/100ML; MG/100ML; MG/100ML
75 INJECTION, SOLUTION INTRAVENOUS CONTINUOUS
Status: DISCONTINUED | OUTPATIENT
Start: 2020-08-26 | End: 2020-08-26 | Stop reason: HOSPADM

## 2020-08-26 RX ORDER — ACETAMINOPHEN 325 MG/1
650 TABLET ORAL EVERY 6 HOURS PRN
Status: DISCONTINUED | OUTPATIENT
Start: 2020-08-26 | End: 2020-08-26 | Stop reason: HOSPADM

## 2020-08-26 RX ORDER — OXYCODONE HYDROCHLORIDE 5 MG/1
5 TABLET ORAL EVERY 4 HOURS PRN
Qty: 15 TABLET | Refills: 0 | Status: SHIPPED | OUTPATIENT
Start: 2020-08-26 | End: 2020-10-05 | Stop reason: ALTCHOICE

## 2020-08-26 RX ORDER — PROPOFOL 10 MG/ML
INJECTION, EMULSION INTRAVENOUS AS NEEDED
Status: DISCONTINUED | OUTPATIENT
Start: 2020-08-26 | End: 2020-08-26

## 2020-08-26 RX ORDER — FENTANYL CITRATE 50 UG/ML
INJECTION, SOLUTION INTRAMUSCULAR; INTRAVENOUS
Status: COMPLETED | OUTPATIENT
Start: 2020-08-26 | End: 2020-08-26

## 2020-08-26 RX ORDER — EPHEDRINE SULFATE 50 MG/ML
INJECTION INTRAVENOUS AS NEEDED
Status: DISCONTINUED | OUTPATIENT
Start: 2020-08-26 | End: 2020-08-26

## 2020-08-26 RX ORDER — CLINDAMYCIN PHOSPHATE 900 MG/50ML
INJECTION INTRAVENOUS AS NEEDED
Status: DISCONTINUED | OUTPATIENT
Start: 2020-08-26 | End: 2020-08-26

## 2020-08-26 RX ADMIN — PROPOFOL 150 MG: 10 INJECTION, EMULSION INTRAVENOUS at 07:34

## 2020-08-26 RX ADMIN — EPHEDRINE SULFATE 10 MG: 50 INJECTION, SOLUTION INTRAVENOUS at 08:16

## 2020-08-26 RX ADMIN — GLYCOPYRROLATE 0.15 MG: 0.2 INJECTION, SOLUTION INTRAMUSCULAR; INTRAVENOUS at 07:31

## 2020-08-26 RX ADMIN — SODIUM CHLORIDE, SODIUM LACTATE, POTASSIUM CHLORIDE, AND CALCIUM CHLORIDE: .6; .31; .03; .02 INJECTION, SOLUTION INTRAVENOUS at 07:10

## 2020-08-26 RX ADMIN — CLINDAMYCIN IN 5 PERCENT DEXTROSE 900 MG: 18 INJECTION, SOLUTION INTRAVENOUS at 07:28

## 2020-08-26 RX ADMIN — FENTANYL CITRATE 100 MCG: 50 INJECTION, SOLUTION INTRAMUSCULAR; INTRAVENOUS at 07:04

## 2020-08-26 RX ADMIN — EPHEDRINE SULFATE 25 MG: 50 INJECTION, SOLUTION INTRAVENOUS at 08:15

## 2020-08-26 RX ADMIN — METOCLOPRAMIDE HYDROCHLORIDE 10 MG: 5 INJECTION INTRAMUSCULAR; INTRAVENOUS at 09:08

## 2020-08-26 RX ADMIN — LIDOCAINE HYDROCHLORIDE 25 MG: 10 INJECTION, SOLUTION EPIDURAL; INFILTRATION; INTRACAUDAL; PERINEURAL at 07:31

## 2020-08-26 RX ADMIN — ONDANSETRON 4 MG: 2 INJECTION INTRAMUSCULAR; INTRAVENOUS at 09:15

## 2020-08-26 RX ADMIN — LIDOCAINE HYDROCHLORIDE 3 ML: 10 INJECTION, SOLUTION EPIDURAL; INFILTRATION; INTRACAUDAL; PERINEURAL at 07:04

## 2020-08-26 RX ADMIN — DEXAMETHASONE SODIUM PHOSPHATE 4 MG: 10 INJECTION, SOLUTION INTRAMUSCULAR; INTRAVENOUS at 07:04

## 2020-08-26 RX ADMIN — ROPIVACAINE HYDROCHLORIDE 30 ML: 5 INJECTION, SOLUTION EPIDURAL; INFILTRATION; PERINEURAL at 07:04

## 2020-08-26 RX ADMIN — MIDAZOLAM 2 MG: 1 INJECTION INTRAMUSCULAR; INTRAVENOUS at 07:04

## 2020-08-26 RX ADMIN — GLYCOPYRROLATE 0.2 MG: 0.2 INJECTION, SOLUTION INTRAMUSCULAR; INTRAVENOUS at 07:34

## 2020-08-26 NOTE — DISCHARGE INSTRUCTIONS
POSTOPERATIVE INSTRUCTIONS following SHOULDER SURGERY    MEDICATIONS:  · Resume all home medications unless otherwise instructed by your surgeon  · Pain Medication:  Oxycodone 5 mg 1 tab oral every 4-6 hours as needed for pain  · If you were given a regional anesthetic (nerve block), please begin taking the pain medication as soon as you get home, even if you have minimal or no pain  DO NOT WAIT FOR THE NERVE BLOCK TO WEAR OFF  · Possible side effects include nausea, constipation, and urinary retention  If you experience these side effects, please call our office for assistance  · Pain med refills are authorized only during office hours (8am-4pm, Mon-Fri)  · Anti-Inflammatory/Pain:   Advil 400 mg oral every 6 hours for 3-5 days for pain control and inflammation (over the counter) and Tylenol 650 mg oral every 6 hours for 3-5 days for pain control (over the counter)  · TAKE WITH FOOD  Stop if you experience nausea, reflux, or stomach pain  · Nausea Medication:  none  · Fill prescription ONLY if you expericnce severe nausea 3    WOUND CARE:  · Keep the dressing clean and dry  Light drainage may occur the first 2 days postop  · You may remove the dressings and get the incision wet in the shower 4 days after surgery  DO NOT remove steri-strips or sutures  DO NOT immerse the incision under water  Carefully pat the incision dry  If there is wound drainage, re-apply a fresh dry gauze dressing  · Please call our office ([phone]) if you experience either of the following:  · Sudden increase in swelling, redness, or warmth at the surgical site  · Excessive incisional drainage that persists beyond the 3rd day after surgery  · Oral temperature greater than 101 degrees, not relieved with Tylenol  · Shortness of breath, chest pain, nausea, or any other concerning symptoms    SWELLING CONTROL:  · Cold Therapy:  Apply ice (20 min on, 20 min off) as often as you feel is necessary    Use ice every few hours during the first 3-4 days post operatively  SLING:  · Wear your sling AT ALL TIMES (including sleep) until your first postoperative office visit  You may remove the sling for showering but must keep your arm at your side  ACTIVITY:   · DO NOT lift, carry, push, or pull anything with your operative arm  · Shoulder:  DO NOT actively (on your own) raise your operative arm away from the side of you body or rotate it out away from your body unless instructed by your surgeon or physical therapist   · Place a pillow behind the elbow while lying down  · Sleeping in a more upright position (recliner) may be more comfortable initially  · Wrist / Finger Motion:  With the sling on, move your wrist and fingers through a full range of motion 20 times per hour while awake  PHYSICAL THERAPY:  · Begin therapy 5 TO 7 DAYS AFTER SURGERY  You were given a prescription for therapy at your preoperative office visit  If you do not have physical therapy scheduled yet, please call our office for assistance      FOLLOW-UP APPOINTMENT:  · 7-10 days after surgery with:    DO Sarah Prather  [facility]  [phone]

## 2020-08-26 NOTE — ANESTHESIA POSTPROCEDURE EVALUATION
Post-Op Assessment Note    CV Status:  Stable  Pain Score: 0    Pain management: adequate     Mental Status:  Alert, awake and agitated (awoke disoriented--reoriented on arrival in pacu  restless/cooperative)   Hydration Status:  Euvolemic   PONV Controlled:  Controlled   Airway Patency:  Patent      Post Op Vitals Reviewed: Yes      Staff: Anesthesiologist, CRNA         No complications documented      BP     Temp      Pulse    Resp      SpO2

## 2020-08-26 NOTE — OP NOTE
OPERATIVE REPORT  PATIENT NAME: Ning Valencia    :  1960  MRN: [id number]  Pt Location: [facility] OR ROOM 03    SURGERY DATE: 2020    Surgeon(s) and Role:     * Pete Harris, DO - Primary     * Travis Mayo  OTC ATC- Assisting      * Savanna Vera PA-C - Assisting    Preop Diagnosis:  Nontraumatic complete tear of right rotator cuff [M75 121]    Post-Op Diagnosis Codes:     * Nontraumatic complete tear of right rotator cuff [M75 121]    Procedure(s) (LRB):  REPAIR ROTATOR CUFF  ARTHROSCOPIC, POSSIBLE BICEPS TENODESIS VS TENOTOMY, SUBACROMIAL DECOMP (Right)  ARTHROSCOPY SHOULDER (Right)    Specimen(s):  * No specimens in log *    Estimated Blood Loss:   Minimal    Drains:  * No LDAs found *    Anesthesia Type:   General    Operative Indications:  Nontraumatic complete tear of right rotator cuff [M75 121]  Tear of subscapularis tendon right shoulder   biceps tendon high-grade partial tear right shoulder    Complications:   None    Procedure and Technique:       Operation:    1  Surgical arthroscopy of the right shoulder with rotator cuff repair  2  Surgical arthroscopy of the right shoulder with limited debridement, biceps tenotomy     Anesthesia:  General with interscalene block     Blood Loss:  Minimal      Indications:   Mr Nanci Hernandez is a 62 y o  male with an acute traumatic full thickness RC tear    An MRI revealed a fully torn rotator cuff  Due to the MRI findings and the fact that the patient had not improved with a conservative approach we discussed rotator cuff repair surgery  We reviewed risks and benefits of surgery  It was agreed  to proceed with surgery to address the pathology that was causing the patient's symptoms  Findings:    Arthroscopic evaluation of the right shoulder revealed the following:   Glenoid articular surface: No notable chondral defects     Humeral head articular surface: No notable chondral defects except for some abrasion adjacent to the long head of the biceps tendon  Labrum: There was tearing of the superior labrum that was involved the bicipital attachment to the superior labrum   The labrum in this region was fragmented and getting entrapped between the humeral head and glenoid  Biceps tendon:high grade partial tearing of biceps tendon with erythema   Rotator cuff: The infraspinatus, teres minor,tendons were entirely intact without evidence of disruption on the articular or bursal surfaces  However, there was a rotator cuff tear involving the supraspinatus tendon, which measured approximately 25 mm from anterior to posterior  There was a partial superior border subscapularis tendon tear as well  Inferior recess: No loose bodies or ligament disruption  Subacromial bursa: Markedly hypertrophic and hyperemic  Acromion:  Normal in appearance  Distal clavicle: No appreciable inferior spurring  Procedure:    In the pre-operative holding area, the patient identified the correct operative extremity and I marked that extremity with my initials, using a permanent marker  The patient was then brought to the operating room and positioned supine  Following satisfactory induction of anesthesia, the patient was positioned in the beach chair position  The entire right operative extremity was draped free and the right shoulder was prepped in the usual sterile fashion for surgery of the shoulder  Before any surgical instrumentation was passed to me by the surgical technician, a formalized time-out occurred, which involves the surgeon, circulating nurse, and anesthesia staff all verifying the correct operative extremity  My initials were visible on the prepped and draped operative field  The anatomic landmarks of the scapular spine, acromion, clavicle, and coracoid process were marked  Subsequently, a posterior portal was marked in the soft recess between the glenoid and humeral head  The posterior portal was established with a scalpel   The arthroscope was introduced through this portal  Under direct visualization, the anterior portal was established with a localizing needle followed by a scalpel  A probe was then introduced into the anterior portal  A systematic diagnostic arthroscopy evaluated the following: glenoid articular surface, humeral head articular surface, labrum, biceps tendon, rotator cuff, and inferior recess  A high grade partial 75% biceps tendon tear was seen  The tear was completed and a biceps tenotomy was performed, as the tissue was of poor quality for tenodesis  The superior and anterior labrum, as well as the inferior rotator cuff were all debrided of any unstable fragments  This completed the biceps tenotomy and limited debridement  The arthroscope was withdrawn from the glenohumeral joint and placed into the subacromial bursa via a separate approach and entry  Under direct visualization, the lateral portal was established with a localizing needle followed by a scalpel  A shaver was introduced into the subacromial space and the space was further established  The acromion, distal clavicle, and rotator cuff were arthroscopically evaluated  The subacromial bursa was markedly hypertrophic and hyperemic  The subacromial bursa was carefully resected with the motorized shaver and electrocautery tool  The size and mobility of the rotator cuff tear was assessed with the tissue grasper  A bleeding bed on the rotator cuff footprint was established with the arthroscopic shaver and lupe  2 [employer] Speedbridge swivel lock anchor(s) was (were) placed into the greater tuberosity   Two fibertape sutures exited each of the anchors, attached together at their tips for passage through the rotator cuff    They were passed arthroscopically through the rotator cuff tear and utilizing arthroscopic techniques the rotator cuff was repaired back to the greater tuberosity by passing the fibertape in a typical double row speed bridge fashion with 2 lateral row swivel lock anchors placed  In this manner, the rotator cuff was repaired anatomically back to the greater tuberosity  There was no additional pathology  All particulate debris was removed  The shoulder was copiously rinsed and then drained  The portals were closed with buried 4-0 monocryl suture, using a simple technique  The skin was cleansed with sterile saline and dried before Steri-Strips were applied  Finally, a sterile dressing was secured by tape, followed by a shoulder brace           I was present for the entire procedure, A qualified resident physician was not available and A physician assistant was required during the procedure for retraction tissue handling,dissection and suturing    Patient Disposition:  PACU     SIGNATURE: Loni Harrison DO  DATE: August 26, 2020  TIME: 9:15 AM

## 2020-08-26 NOTE — ANESTHESIA PREPROCEDURE EVALUATION
Procedure:  REPAIR ROTATOR CUFF  ARTHROSCOPIC, POSSIBLE BICEPS TENODESIS VS TENOTOMY, SUBACROMIAL DECOMP (Right Shoulder)    Relevant Problems   No relevant active problems        Physical Exam    Airway    Mallampati score: II  TM Distance: >3 FB  Neck ROM: full     Dental   No notable dental hx     Cardiovascular  Rhythm: regular, Rate: normal, Cardiovascular exam normal    Pulmonary  Pulmonary exam normal Breath sounds clear to auscultation,     Other Findings        Anesthesia Plan  ASA Score- 3     Anesthesia Type- regional with ASA Monitors  Additional Monitors:   Airway Plan: LMA  Comment: GA-LMA with right ISB via U/S guidance  Plan Factors-Exercise tolerance (METS): >4 METS  Chart reviewed  EKG reviewed  Existing labs reviewed  Patient summary reviewed  Obstructive sleep apnea risk education given perioperatively  Induction- intravenous  Postoperative Plan- Plan for postoperative opioid use  Informed Consent- Anesthetic plan and risks discussed with patient and spouse  I personally reviewed this patient with the CRNA  Discussed and agreed on the Anesthesia Plan with the CRNA  Estrella Melton

## 2020-08-26 NOTE — ANESTHESIA PROCEDURE NOTES
Peripheral Block    Patient location during procedure: holding area  Start time: 8/26/2020 6:58 AM  Reason for block: at surgeon's request and post-op pain management  Staffing  Anesthesiologist: Mir Rosado MD  Performed: anesthesiologist   Preanesthetic Checklist  Completed: patient identified, site marked, surgical consent, pre-op evaluation, timeout performed, IV checked, risks and benefits discussed and monitors and equipment checked  Peripheral Block  Patient position: supine  Prep: Betadine  Patient monitoring: heart rate, continuous pulse ox and frequent blood pressure checks  Block type: interscalene  Laterality: right  Injection technique: single-shot  Procedures: ultrasound guided, Ultrasound guidance required for the procedure to increase accuracy and safety of medication placement and decrease risk of complications   and nerve stimulator  Ultrasound permanent image savedropivacaine (NAROPIN) 0 5 % perineural infiltration, 30 mL  midazolam (VERSED) 2 mg/2 mL IV, 2 mg  fentaNYL 50 mcg/mL IV, 100 mcg  lidocaine (PF) (XYLOCAINE-MPF) 1 % infiltration, 3 mL  Needle  Needle type: Stimuplex   Needle gauge: 22 G  Needle length: 10 cm  Needle localization: ultrasound guidance and nerve stimulator  Test dose: negative  Assessment  Injection assessment: incremental injection, local visualized surrounding nerve on ultrasound, negative aspiration for CSF, negative aspiration for heme and no paresthesia on injection  Paresthesia pain: none  Heart rate change: no  Slow fractionated injection: yes  Post-procedure:  site cleaned  patient tolerated the procedure well with no immediate complications

## 2020-09-02 ENCOUNTER — OFFICE VISIT (OUTPATIENT)
Dept: OBGYN CLINIC | Facility: CLINIC | Age: 60
End: 2020-09-02

## 2020-09-02 VITALS
HEIGHT: 72 IN | SYSTOLIC BLOOD PRESSURE: 130 MMHG | BODY MASS INDEX: 35.21 KG/M2 | DIASTOLIC BLOOD PRESSURE: 84 MMHG | WEIGHT: 260 LBS | HEART RATE: 84 BPM | TEMPERATURE: 98.1 F

## 2020-09-02 DIAGNOSIS — Z98.890 S/P RIGHT ROTATOR CUFF REPAIR: Primary | ICD-10-CM

## 2020-09-02 PROCEDURE — 99024 POSTOP FOLLOW-UP VISIT: CPT | Performed by: ORTHOPAEDIC SURGERY

## 2020-09-02 NOTE — PROGRESS NOTES
Shoulder Post Operative Visit     Assesment:     62 y o  male 1 week s/p  Surgical Arthroscopy of the right shoulder with rotator cuff repair, subacromial decompression and arthroscopic biceps tenotomy, DOS: 8/26/2020    Plan:    Post-Operative treatment:    Ice to shoulder 1-2 times daily, for 20 minutes at a time  PT for ROM and strengthening to shoulder, rotator cuff, scapular stabilizers  Analgesics as needed for pain    Imaging: All imaging from today was reviewed by myself and explained to the patient  Sling:  at all times other than showering, 6 weeks total in sling  Did instruct that he can take the arm out of the sling to perform elbow, wrist, and hand ROM to prevent stiffness  DVT Prophylaxis:  Ambulation    Follow up:   6 weeks     Patient was advised that if they have any fevers, chills, chest pain, shortness of breath, redness or drainage from the incision, please let our office know immediately  Chief Complaint   Patient presents with    Left Knee - Follow-up       History of Present Illness: The patient is a 62 y o  male who is being evaluated post operatively 1 week  status post Surgical Arthroscopy of the right shoulder with rotator cuff repair, subacromial decompression and arthroscopic biceps tenotomy, DOS: 8/26/2020  Since the prior visit, He reports minimal improvement  Pain is well controlled  The patient is using ice to control swelling  They have not started physical therapy  The patient is ambulating for DVT ppx  The patient is using their sling at all times other than showering    The patient denies any fevers, chills, calf pain, chest pain/shortness of breath, redness or drainage from the incision  I have reviewed the past medical, surgical, social and family history, medications and allergies as documented in the EMR  Review of systems: ROS is negative other than that noted in the HPI  Constitutional: Negative for fatigue and fever  Physical Exam:    Blood pressure 130/84, pulse 84, temperature 98 1 °F (36 7 °C), height 6' (1 829 m), weight 118 kg (260 lb)      General/Constitutional: NAD, well developed, well nourished  HENT: Normocephalic, atraumatic  CV: Intact distal pulses, regular rate  Resp: No respiratory distress or labored breathing  Lymphatic: No lymphadenopathy palpated  Neuro: Alert and Oriented x 3, no focal deficits  Psych: Normal mood, normal affect, normal judgement, normal behavior  Skin: Warm, dry, no rashes, no erythema    Incisions show no erythema, no drainage    Shoulder focused exam:       RIGHT LEFT    Scapula Atrophy Negative Negative     Winging Negative Negative     Protraction Negative Negative    Rotator cuff SS Deferred due to post op period 5/5     IS Deferred due to post op period 5/5     SubS Deferred due to post op period 5/5    ROM FF Deferred due to post op period 170     ER0 Deferred due to post op period 61     ER90 Deferred due to post op period 80     IR90 Deferred due to post op period T6     IRb Deferred due to post op period T6    TTP:  None None    Stability:  stable stable      UE NV Exam: +2 Radial pulses bilaterally  Sensation intact to light touch C5-T1 bilaterally, Radial/median/ulnar nerve motor intact

## 2020-09-09 ENCOUNTER — EVALUATION (OUTPATIENT)
Dept: PHYSICAL THERAPY | Facility: CLINIC | Age: 60
End: 2020-09-09
Payer: COMMERCIAL

## 2020-09-09 DIAGNOSIS — M75.121 NONTRAUMATIC COMPLETE TEAR OF RIGHT ROTATOR CUFF: ICD-10-CM

## 2020-09-09 DIAGNOSIS — Z98.890 H/O ROTATOR CUFF SURGERY: Primary | ICD-10-CM

## 2020-09-09 PROCEDURE — 97140 MANUAL THERAPY 1/> REGIONS: CPT | Performed by: PHYSICAL THERAPIST

## 2020-09-09 PROCEDURE — 97161 PT EVAL LOW COMPLEX 20 MIN: CPT | Performed by: PHYSICAL THERAPIST

## 2020-09-09 NOTE — PROGRESS NOTES
PT Evaluation     Today's date: 2020  Patient name: Nani Bumpers  : 1960  MRN: [id number]  Referring provider: Piter Stanley  Dx:   Encounter Diagnosis     ICD-10-CM    1  H/O rotator cuff surgery  Z98 890    2  Nontraumatic complete tear of right rotator cuff  M75 121 Ambulatory referral to Physical Therapy                  Assessment  Assessment details: Nani Bumpers is a 62 y o  male presenting to outpatient physical therapy at [place] [address] with complaints of R shoulder pain  He presents with decreased range of motion, decreased strength, limited flexibility, poor postural awareness, decreased tolerance to activity and decreased functional mobility due to H/O rotator cuff surgery on 20 (primary encounter diagnosis), nontraumatic complete tear of right rotator cuff  He would benefit from skilled PT services in order to address these deficits and reach maximum level of function  Thank you for the referral!  Impairments: abnormal or restricted ROM, activity intolerance, impaired physical strength, lacks appropriate home exercise program, pain with function and poor posture   Barriers to therapy: None  Understanding of Dx/Px/POC: excellent  Goals  ST  Independent with HEP in 2 weeks  2  Increase R shoulder PROM to within 10 degrees of WNL all motions in 4 weeks   3  Good postural awareness in 2 weeks    LT  Achieve FOTO score of 59/100 in 12 weeks   2  Able to perform all normal ADLs with R hand up to 10# lifting in 12 weeks  3  Strength R UE = 4+/5 all motions in 12 weeks  4    No tenderness R shoulder in 10 weeks    Plan  Patient would benefit from: skilled PT and PT eval  Planned modality interventions: cryotherapy  Planned therapy interventions: ADL retraining, flexibility, functional ROM exercises, home exercise program, joint mobilization, manual therapy, neuromuscular re-education, postural training, strengthening, stretching, therapeutic activities and therapeutic exercise  Frequency: 2x week  Duration in weeks: 12  Treatment plan discussed with: patient        Subjective Evaluation    History of Present Illness  Mechanism of injury: Pt reports having R shoulder pain for a few years, but more in the past 10 months after swatting a fly in 2019  Pain was constant  On 20 he had surgical arthroscopy of the right shoulder with rotator cuff repair, subacromial decompression and arthroscopic biceps tenotomy  He is using a sling at all times other than showering    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  Having difficulty typing  Unable to lift weights  Unable to walk his dogs               Not a recurrent problem   Quality of life: excellent    Pain  Current pain ratin  At best pain rating: 3  At worst pain ratin  Quality: tight, sharp and dull ache  Relieving factors: ice  Progression: no change    Social Support  Lives with: spouse and young children    Employment status: working  Hand dominance: right    Treatments  Previous treatment: immobilization and medication  Current treatment: immobilization and medication  Patient Goals  Patient goals for therapy: decreased edema, increased strength, return to sport/leisure activities, independence with ADLs/IADLs, decreased pain and increased motion          Objective     Static Posture     Shoulders  Rounded  Postural Observations  Seated posture: fair  Standing posture: good        Observations     Right Shoulder  Negative for effusion  Palpation     Right   Tenderness of the biceps  Right Shoulder Comments  Right biceps: slight  Tenderness     Right Shoulder  Tenderness in the [facility] joint and biceps tendon (proximal)       Neurological Testing     Sensation     Shoulder   Left Shoulder   Intact: light touch    Right Shoulder   Intact: light touch    Reflexes   Left   Biceps (C5/C6): normal (2+)    Right   Biceps (C5/C6): normal (2+)    Active Range of Motion   Left Shoulder   Normal active range of motion    Left Elbow   Normal active range of motion    Right Elbow   Normal active range of motion    Additional Active Range of Motion Details  R shoulder AROM N/T due to recent surgery  Passive Range of Motion     Right Shoulder   Flexion: 105 degrees   Extension: 25 degrees   Abduction: 90 degrees   External rotation 0°: 49 degrees   Internal rotation 90°: 68 degrees     Scapular Mobility     Right Shoulder   Scapular Dyskinesis: none    Strength/Myotome Testing     Left Shoulder   Normal muscle strength    Left Elbow   Normal strength    Right Elbow   Flexion: 4-  Extension: 4-    Additional Strength Details  R UE N/T due to recent surgery           POC EXPIRES On:  12/2/20  PRECAUTIONS:  Use sling at all times when not in PT or showering until 9/23/20  CO-MORBIDITES:  None  PERSONAL FACTORS:  None      Manuals HEP 9/9           PROM R shoulder  20'                                                  Neuro Re-Ed                                                                                                Ther Ex                                                                                                            Ther Activity                              Gait Training                              Modalities    CP R shoulder supine  10'

## 2020-09-11 ENCOUNTER — OFFICE VISIT (OUTPATIENT)
Dept: PHYSICAL THERAPY | Facility: CLINIC | Age: 60
End: 2020-09-11
Payer: COMMERCIAL

## 2020-09-11 DIAGNOSIS — M75.121 NONTRAUMATIC COMPLETE TEAR OF RIGHT ROTATOR CUFF: ICD-10-CM

## 2020-09-11 DIAGNOSIS — Z98.890 H/O ROTATOR CUFF SURGERY: Primary | ICD-10-CM

## 2020-09-11 PROCEDURE — 97140 MANUAL THERAPY 1/> REGIONS: CPT | Performed by: PHYSICAL THERAPIST

## 2020-09-11 NOTE — PROGRESS NOTES
Daily Note     Today's date: 2020  Patient name: Yehuda John  : 1960  MRN: [id number]  Referring provider: Damon King  Dx:   Encounter Diagnosis     ICD-10-CM    1  H/O rotator cuff surgery  Z98 890    2  Nontraumatic complete tear of right rotator cuff  M75 121                   Subjective:  Pt reports feeling sore, but ROM is feeling good  Objective:  See treatment diary below      Assessment:  Pt presented to outpatient physical therapy at [place] [address] with complaints of R shoulder pain  He presented with decreased range of motion, decreased strength, limited flexibility, poor postural awareness, decreased tolerance to activity and decreased functional mobility due to H/O rotator cuff surgery on 20 (primary encounter diagnosis), nontraumatic complete tear of right rotator cuff  He would benefit from skilled PT services in order to address these deficits and reach maximum level of function  Pt is not using his sling as much as he should  Was encouraged to use it more despite having almost full PROM R shoulder already  Plan:  Continue to progress R shoulder PROM  Add table slides, pulleys           POC EXPIRES On:  20  PRECAUTIONS:  Use sling at all times when not in PT or showering until 20  CO-MORBIDITES:  None  PERSONAL FACTORS:  None        Manuals HEP                  PROM R shoulder   20'  25'                                                                                         Neuro Re-Ed                                                                                                                                                                              Ther Ex                                                                                                                                                                                                     Ther Activity                                                     Gait Training                                                     Modalities     CP R shoulder supine   10'  10'

## 2020-09-14 ENCOUNTER — OFFICE VISIT (OUTPATIENT)
Dept: PHYSICAL THERAPY | Facility: CLINIC | Age: 60
End: 2020-09-14
Payer: COMMERCIAL

## 2020-09-14 DIAGNOSIS — M75.121 NONTRAUMATIC COMPLETE TEAR OF RIGHT ROTATOR CUFF: ICD-10-CM

## 2020-09-14 DIAGNOSIS — Z98.890 H/O ROTATOR CUFF SURGERY: Primary | ICD-10-CM

## 2020-09-14 PROCEDURE — 97140 MANUAL THERAPY 1/> REGIONS: CPT | Performed by: PHYSICAL THERAPIST

## 2020-09-14 NOTE — PROGRESS NOTES
Daily Note     Today's date: 2020  Patient name: Sheryl Bedoya  : 1960  MRN: [id number]  Referring provider: Myranda Jones  Dx:   Encounter Diagnosis     ICD-10-CM    1  H/O rotator cuff surgery  Z98 890    2  Nontraumatic complete tear of right rotator cuff  M75 121                   Subjective:  Pt reports feeling better  Objective:  See treatment diary below      Assessment:  Pt presented to outpatient physical therapy at [place] [address] with complaints of R shoulder pain  He presented with decreased range of motion, decreased strength, limited flexibility, poor postural awareness, decreased tolerance to activity and decreased functional mobility due to H/O rotator cuff surgery on 20 (primary encounter diagnosis), nontraumatic complete tear of right rotator cuff  He would benefit from skilled PT services in order to address these deficits and reach maximum level of function  Pt is not using his sling as much as he should  Was encouraged to use it more despite having almost full PROM R shoulder already with only lacking about 10 degrees in flex, abd, ER  Pt has some difficulty fully relaxing R UE during PROM  Plan:  Continue to progress R shoulder PROM  Add pulleys           POC EXPIRES On:  20  PRECAUTIONS:  Use sling at all times when not in PT or showering until 20  CO-MORBIDITES:  None  PERSONAL FACTORS:  None        Manuals HEP                PROM R shoulder   20'  25'  25'                                                                                       Neuro Re-Ed                                                                                                                                                                              Ther Ex      Table slides R shoulder flex        10                Pendulums R        2'                Pulleys R shoulder flex        NV                Cane R shoulder PROM OH sitting        10                                                                                                               Ther Activity                                                     Gait Training                                                     Modalities     CP R shoulder supine   10'  10'  10'

## 2020-09-16 ENCOUNTER — OFFICE VISIT (OUTPATIENT)
Dept: PHYSICAL THERAPY | Facility: CLINIC | Age: 60
End: 2020-09-16
Payer: COMMERCIAL

## 2020-09-16 DIAGNOSIS — Z98.890 H/O ROTATOR CUFF SURGERY: Primary | ICD-10-CM

## 2020-09-16 DIAGNOSIS — M75.121 NONTRAUMATIC COMPLETE TEAR OF RIGHT ROTATOR CUFF: ICD-10-CM

## 2020-09-16 PROCEDURE — 97110 THERAPEUTIC EXERCISES: CPT

## 2020-09-16 PROCEDURE — 97140 MANUAL THERAPY 1/> REGIONS: CPT

## 2020-09-16 NOTE — PROGRESS NOTES
Daily Note     Today's date: 2020  Patient name: Brittaney Connor  : 1960  MRN: [id number]  Referring provider: Coreen Gracia  Dx:   Encounter Diagnosis     ICD-10-CM    1  H/O rotator cuff surgery  Z98 890    2  Nontraumatic complete tear of right rotator cuff  M75 121                   Subjective:  Pt reports feeling good  Objective:  See treatment diary below      Assessment:  Pt presented to outpatient physical therapy at [place] [address] with complaints of R shoulder pain  He presented with decreased range of motion, decreased strength, limited flexibility, poor postural awareness, decreased tolerance to activity and decreased functional mobility due to H/O rotator cuff surgery on 20 (primary encounter diagnosis), nontraumatic complete tear of right rotator cuff  He would benefit from skilled PT services in order to address these deficits and reach maximum level of function  Introduced pulleys and increased reps to below TE with good tolerance, no complain of pain  Pt PROM continues to improve each PT session  Plan:  Continue to progress R shoulder PROM  Add pulleys           POC EXPIRES On:  20  PRECAUTIONS:  Use sling at all times when not in PT or showering until 20  CO-MORBIDITES:  None  PERSONAL FACTORS:  None        Manuals HEP              PROM R shoulder   20'  25'  25'  25'                                                                                     Neuro Re-Ed                                                                                                                                                                              Ther Ex      Table slides R shoulder flex        10  3x10             Table slides R should R scap     3x10         Pendulums R        2'  2'              Pulleys R shoulder flex        NV  3'              Cane R shoulder PROM OH sitting        10 2x10                                                                                                             Ther Activity                                                     Gait Training                                                     Modalities     CP R shoulder supine   10'  10'  10'  10'

## 2020-09-21 ENCOUNTER — OFFICE VISIT (OUTPATIENT)
Dept: PHYSICAL THERAPY | Facility: CLINIC | Age: 60
End: 2020-09-21
Payer: COMMERCIAL

## 2020-09-21 DIAGNOSIS — Z98.890 H/O ROTATOR CUFF SURGERY: Primary | ICD-10-CM

## 2020-09-21 DIAGNOSIS — M75.121 NONTRAUMATIC COMPLETE TEAR OF RIGHT ROTATOR CUFF: ICD-10-CM

## 2020-09-21 PROCEDURE — 97140 MANUAL THERAPY 1/> REGIONS: CPT

## 2020-09-21 PROCEDURE — 97110 THERAPEUTIC EXERCISES: CPT

## 2020-09-21 NOTE — PROGRESS NOTES
Daily Note     Today's date: 2020  Patient name: Verito Pcek  : 1960  MRN: [id number]  Referring provider: Luz Liang  Dx:   Encounter Diagnosis     ICD-10-CM    1  H/O rotator cuff surgery  Z98 890    2  Nontraumatic complete tear of right rotator cuff  M75 121                   Subjective:  Pt reports he has not used sling since last Wednesday due to being uncomfortable  He states he has been working on stretching shoulder at home, still has intermittent discomfort but overall improving  Objective:  See treatment diary below      Assessment:  Pt presented to outpatient physical therapy at [place] [address] with complaints of R shoulder pain  He presented with decreased range of motion, decreased strength, limited flexibility, poor postural awareness, decreased tolerance to activity and decreased functional mobility due to H/O rotator cuff surgery on 20 (primary encounter diagnosis), nontraumatic complete tear of right rotator cuff  He would benefit from skilled PT services in order to address these deficits and reach maximum level of function  Pt able to complete prescribed exercise program with moderate difficulty  PROM continues to improve, limited primarily with flexion and abduction ~ 10 degrees secondary to discomfort  Soreness and fatigue present post session  Pt would benefit from continued PT in effort to further improve ROM and maximize function with reduced pain/frequency of symptoms  Plan:  Continue to progress R shoulder PROM          POC EXPIRES On:  20  PRECAUTIONS:  Use sling at all times when not in PT or showering until 20  CO-MORBIDITES:  None  PERSONAL FACTORS:  None        Manuals HEP            PROM R shoulder   20'  25'  25'  25'  25'                                                                                   Neuro Re-Ed                                                                                                                                                                              Ther Ex      Table slides R shoulder flex        10  3x10  3x10           Table slides R should R scap     3x10 3x10        Pendulums R        2'  2'  2'            Pulleys R shoulder flex        NV  3'  3'            Cane R shoulder PROM OH sitting        10 2x10  2x15                                                                                                           Ther Activity                                                     Gait Training                                                     Modalities     CP R shoulder supine   10'  10'  10'  10'  10'

## 2020-09-23 ENCOUNTER — OFFICE VISIT (OUTPATIENT)
Dept: PHYSICAL THERAPY | Facility: CLINIC | Age: 60
End: 2020-09-23
Payer: COMMERCIAL

## 2020-09-23 DIAGNOSIS — Z98.890 H/O ROTATOR CUFF SURGERY: Primary | ICD-10-CM

## 2020-09-23 DIAGNOSIS — M75.121 NONTRAUMATIC COMPLETE TEAR OF RIGHT ROTATOR CUFF: ICD-10-CM

## 2020-09-23 PROCEDURE — 97140 MANUAL THERAPY 1/> REGIONS: CPT

## 2020-09-23 PROCEDURE — 97110 THERAPEUTIC EXERCISES: CPT

## 2020-09-23 NOTE — PROGRESS NOTES
Daily Note     Today's date: 2020  Patient name: Bacilio Quezada  : 1960  MRN: [id number]  Referring provider: Mary Ferrari  Dx:   Encounter Diagnosis     ICD-10-CM    1  H/O rotator cuff surgery  Z98 890    2  Nontraumatic complete tear of right rotator cuff  M75 121                   Subjective: Pt reports sleeping in bed and waking only 1-2/a night  Pt w/ mild pain at rests  Objective: See treatment diary below      Assessment: Tolerated treatment well  Patient doing well w/ protocol  PROM w/ no guarding or hard end feel  Plan: Continue per plan of care  Progress treament per protocol        POC EXPIRES On:  20  PRECAUTIONS:  Use sling at all times when not in PT or showering until 20  CO-MORBIDITES:  None  PERSONAL FACTORS:  None        Manuals HEP          PROM R shoulder   20'  25'  25'  25'  25'  25JK'          scap PROM              JK                                                         Neuro Re-Ed                                                                                                                                                                              Ther Ex      Table slides R shoulder flex        10  3x10  3x10  3x10         Table slides R should R scap     3x10 3x10 3x10       Pendulums R        2'  2'  2'            Pulleys R shoulder flex        NV  3'  3'  3'          Cane R shoulder PROM OH sitting        10 2x10  2x15 2x15                                                                                                         Ther Activity                                                     Gait Training                                                     Modalities     CP R shoulder supine   10'  10'  10'  10'  10'  10'

## 2020-09-28 ENCOUNTER — OFFICE VISIT (OUTPATIENT)
Dept: PHYSICAL THERAPY | Facility: CLINIC | Age: 60
End: 2020-09-28
Payer: COMMERCIAL

## 2020-09-28 DIAGNOSIS — M75.121 NONTRAUMATIC COMPLETE TEAR OF RIGHT ROTATOR CUFF: Primary | ICD-10-CM

## 2020-09-28 DIAGNOSIS — Z98.890 H/O ROTATOR CUFF SURGERY: ICD-10-CM

## 2020-09-28 PROCEDURE — 97110 THERAPEUTIC EXERCISES: CPT

## 2020-09-28 PROCEDURE — 97140 MANUAL THERAPY 1/> REGIONS: CPT

## 2020-09-28 NOTE — PROGRESS NOTES
Daily Note     Today's date: 2020  Patient name: Rachel Young  : 1960  MRN: [id number]  Referring provider: Sebastian Matos  Dx:   Encounter Diagnosis     ICD-10-CM    1  Nontraumatic complete tear of right rotator cuff  M75 121    2  H/O rotator cuff surgery  Z98 890        Start Time: 1115  Stop Time: 1200  Total time in clinic (min): 45 minutes    Subjective: Pt reports feeling good overall  Objective: See treatment diary below      Assessment: Pt performed below TE with good tolerance and technique  Noted pt PROM has significantly improved, introduced scap + pulley's 3' no increased pain/discomfort  Plan: Continue per plan of care  Progress treament per protocol        POC EXPIRES On:  20  PRECAUTIONS:  Use sling at all times when not in PT or showering until 20  CO-MORBIDITES:  None  PERSONAL FACTORS:  None        Manuals HEP        PROM R shoulder   20'  25'  25'  25'  25'  25JK'  25'        scap PROM              JK                                                         Neuro Re-Ed                                                                                                                                                                              Ther Ex      Table slides R shoulder flex        10  3x10  3x10  3x10  3x10       Table slides R should R scap     3x10 3x10 3x10 3x10      Pendulums R        2'  2'  2'    2'        Pulleys R shoulder flex        NV  3'  3'  3'  3'       Pulleys R shoulder abd         3'      Cane R shoulder PROM OH sitting        10 2x10  2x15 2x15  2x15                                                                                                       Ther Activity                                                     Gait Training                                                     Modalities     CP R shoulder supine   10'  10'  10'  10'  10'  10'  10'

## 2020-09-30 ENCOUNTER — OFFICE VISIT (OUTPATIENT)
Dept: PHYSICAL THERAPY | Facility: CLINIC | Age: 60
End: 2020-09-30
Payer: COMMERCIAL

## 2020-09-30 DIAGNOSIS — M75.121 NONTRAUMATIC COMPLETE TEAR OF RIGHT ROTATOR CUFF: Primary | ICD-10-CM

## 2020-09-30 DIAGNOSIS — Z98.890 H/O ROTATOR CUFF SURGERY: ICD-10-CM

## 2020-09-30 PROCEDURE — 97140 MANUAL THERAPY 1/> REGIONS: CPT | Performed by: PHYSICAL THERAPIST

## 2020-09-30 PROCEDURE — 97110 THERAPEUTIC EXERCISES: CPT | Performed by: PHYSICAL THERAPIST

## 2020-09-30 NOTE — PROGRESS NOTES
Daily Note     Today's date: 2020  Patient name: Amy Nguyen  : 1960  MRN: [id number]  Referring provider: Tyree Dang  Dx:   Encounter Diagnosis     ICD-10-CM    1  Nontraumatic complete tear of right rotator cuff  M75 121    2  H/O rotator cuff surgery  Z98 890                   Subjective:  Pt reports feeling good  Pain level is minimal now  Objective:  See treatment diary below      Assessment:  Pt is showing excellent progression with R shoulder ROM to WNL all motions now with only min endrange pain in ER  No pain with increase in in activity today with wall slides, prone traps and t-band rows  Plan:  Add cone OH reach, ball rolls on wall  Add strength for R shoulder in 1 week  POC EXPIRES On:  20  PRECAUTIONS:  Use sling at all times when not in PT or showering until 20    RTC repair on 20  CO-MORBIDITES:  None  PERSONAL FACTORS:  None        Manuals HEP            PROM R shoulder  12'                                                  Neuro Re-Ed                                                                                                Ther Ex    Pulleys R shoulder flex + abd  3' ea           Seated cane AAROM R shoulder flex  20           R shoulder wall wash flex + scap  30 ea           TB B rows  Blue 30           Prone traps - 3 way  15 ea                                                  Ther Activity                              Gait Training                              Modalities    CP R shoulder  10'

## 2020-10-05 ENCOUNTER — OFFICE VISIT (OUTPATIENT)
Dept: FAMILY MEDICINE CLINIC | Facility: HOSPITAL | Age: 60
End: 2020-10-05
Payer: COMMERCIAL

## 2020-10-05 ENCOUNTER — APPOINTMENT (OUTPATIENT)
Dept: PHYSICAL THERAPY | Facility: CLINIC | Age: 60
End: 2020-10-05
Payer: COMMERCIAL

## 2020-10-05 VITALS
HEIGHT: 72 IN | WEIGHT: 256.8 LBS | HEART RATE: 60 BPM | SYSTOLIC BLOOD PRESSURE: 130 MMHG | TEMPERATURE: 98.6 F | BODY MASS INDEX: 34.78 KG/M2 | DIASTOLIC BLOOD PRESSURE: 70 MMHG | OXYGEN SATURATION: 97 %

## 2020-10-05 DIAGNOSIS — R39.9 UTI SYMPTOMS: Primary | ICD-10-CM

## 2020-10-05 LAB
SL AMB  POCT GLUCOSE, UA: NORMAL
SL AMB LEUKOCYTE ESTERASE,UA: ABNORMAL
SL AMB POCT BILIRUBIN,UA: ABNORMAL
SL AMB POCT BLOOD,UA: 250
SL AMB POCT CLARITY,UA: ABNORMAL
SL AMB POCT COLOR,UA: YELLOW
SL AMB POCT KETONES,UA: ABNORMAL
SL AMB POCT NITRITE,UA: ABNORMAL
SL AMB POCT PH,UA: 5
SL AMB POCT SPECIFIC GRAVITY,UA: 1
SL AMB POCT URINE PROTEIN: ABNORMAL
SL AMB POCT UROBILINOGEN: NORMAL

## 2020-10-05 PROCEDURE — 81002 URINALYSIS NONAUTO W/O SCOPE: CPT | Performed by: INTERNAL MEDICINE

## 2020-10-05 PROCEDURE — 87077 CULTURE AEROBIC IDENTIFY: CPT | Performed by: NURSE PRACTITIONER

## 2020-10-05 PROCEDURE — 1036F TOBACCO NON-USER: CPT | Performed by: NURSE PRACTITIONER

## 2020-10-05 PROCEDURE — 87186 SC STD MICRODIL/AGAR DIL: CPT | Performed by: NURSE PRACTITIONER

## 2020-10-05 PROCEDURE — 99214 OFFICE O/P EST MOD 30 MIN: CPT | Performed by: NURSE PRACTITIONER

## 2020-10-05 PROCEDURE — 87086 URINE CULTURE/COLONY COUNT: CPT | Performed by: NURSE PRACTITIONER

## 2020-10-05 RX ORDER — NITROFURANTOIN 25; 75 MG/1; MG/1
100 CAPSULE ORAL 2 TIMES DAILY
Qty: 10 CAPSULE | Refills: 0 | Status: SHIPPED | OUTPATIENT
Start: 2020-10-05 | End: 2020-10-10

## 2020-10-06 ENCOUNTER — OFFICE VISIT (OUTPATIENT)
Dept: PHYSICAL THERAPY | Facility: CLINIC | Age: 60
End: 2020-10-06
Payer: COMMERCIAL

## 2020-10-06 DIAGNOSIS — M75.121 NONTRAUMATIC COMPLETE TEAR OF RIGHT ROTATOR CUFF: Primary | ICD-10-CM

## 2020-10-06 DIAGNOSIS — Z98.890 H/O ROTATOR CUFF SURGERY: ICD-10-CM

## 2020-10-06 PROCEDURE — 97140 MANUAL THERAPY 1/> REGIONS: CPT

## 2020-10-06 PROCEDURE — 97110 THERAPEUTIC EXERCISES: CPT

## 2020-10-07 ENCOUNTER — OFFICE VISIT (OUTPATIENT)
Dept: PHYSICAL THERAPY | Facility: CLINIC | Age: 60
End: 2020-10-07
Payer: COMMERCIAL

## 2020-10-07 DIAGNOSIS — M75.121 NONTRAUMATIC COMPLETE TEAR OF RIGHT ROTATOR CUFF: Primary | ICD-10-CM

## 2020-10-07 DIAGNOSIS — Z98.890 H/O ROTATOR CUFF SURGERY: ICD-10-CM

## 2020-10-07 LAB — BACTERIA UR CULT: ABNORMAL

## 2020-10-07 PROCEDURE — 97110 THERAPEUTIC EXERCISES: CPT

## 2020-10-07 PROCEDURE — 97140 MANUAL THERAPY 1/> REGIONS: CPT

## 2020-10-12 ENCOUNTER — APPOINTMENT (OUTPATIENT)
Dept: PHYSICAL THERAPY | Facility: CLINIC | Age: 60
End: 2020-10-12
Payer: COMMERCIAL

## 2020-10-14 ENCOUNTER — OFFICE VISIT (OUTPATIENT)
Dept: PHYSICAL THERAPY | Facility: CLINIC | Age: 60
End: 2020-10-14
Payer: COMMERCIAL

## 2020-10-14 DIAGNOSIS — Z98.890 H/O ROTATOR CUFF SURGERY: ICD-10-CM

## 2020-10-14 DIAGNOSIS — M75.121 NONTRAUMATIC COMPLETE TEAR OF RIGHT ROTATOR CUFF: Primary | ICD-10-CM

## 2020-10-14 PROCEDURE — 97110 THERAPEUTIC EXERCISES: CPT

## 2020-10-14 PROCEDURE — 97112 NEUROMUSCULAR REEDUCATION: CPT

## 2020-10-16 ENCOUNTER — OFFICE VISIT (OUTPATIENT)
Dept: PHYSICAL THERAPY | Facility: CLINIC | Age: 60
End: 2020-10-16
Payer: COMMERCIAL

## 2020-10-16 ENCOUNTER — OFFICE VISIT (OUTPATIENT)
Dept: OBGYN CLINIC | Facility: CLINIC | Age: 60
End: 2020-10-16

## 2020-10-16 VITALS
BODY MASS INDEX: 34.54 KG/M2 | DIASTOLIC BLOOD PRESSURE: 82 MMHG | SYSTOLIC BLOOD PRESSURE: 126 MMHG | WEIGHT: 255 LBS | HEIGHT: 72 IN | HEART RATE: 84 BPM | TEMPERATURE: 97.9 F

## 2020-10-16 DIAGNOSIS — M75.121 NONTRAUMATIC COMPLETE TEAR OF RIGHT ROTATOR CUFF: Primary | ICD-10-CM

## 2020-10-16 DIAGNOSIS — Z98.890 H/O ROTATOR CUFF SURGERY: ICD-10-CM

## 2020-10-16 DIAGNOSIS — Z98.890 S/P RIGHT ROTATOR CUFF REPAIR: Primary | ICD-10-CM

## 2020-10-16 PROCEDURE — 97112 NEUROMUSCULAR REEDUCATION: CPT

## 2020-10-16 PROCEDURE — 97110 THERAPEUTIC EXERCISES: CPT

## 2020-10-16 PROCEDURE — 99024 POSTOP FOLLOW-UP VISIT: CPT | Performed by: ORTHOPAEDIC SURGERY

## 2020-10-17 ENCOUNTER — APPOINTMENT (EMERGENCY)
Dept: CT IMAGING | Facility: HOSPITAL | Age: 60
End: 2020-10-17
Payer: COMMERCIAL

## 2020-10-17 ENCOUNTER — HOSPITAL ENCOUNTER (EMERGENCY)
Facility: HOSPITAL | Age: 60
Discharge: HOME | End: 2020-10-17
Attending: EMERGENCY MEDICINE | Admitting: EMERGENCY MEDICINE
Payer: COMMERCIAL

## 2020-10-17 VITALS
BODY MASS INDEX: 33.86 KG/M2 | RESPIRATION RATE: 17 BRPM | SYSTOLIC BLOOD PRESSURE: 135 MMHG | TEMPERATURE: 98 F | HEIGHT: 72 IN | DIASTOLIC BLOOD PRESSURE: 73 MMHG | OXYGEN SATURATION: 96 % | WEIGHT: 250 LBS | HEART RATE: 55 BPM

## 2020-10-17 DIAGNOSIS — N28.1 RENAL CYST, RIGHT: ICD-10-CM

## 2020-10-17 DIAGNOSIS — N39.0 UTI (URINARY TRACT INFECTION): Primary | ICD-10-CM

## 2020-10-17 LAB
ALBUMIN SERPL BCP-MCNC: 3.4 G/DL (ref 3.5–5)
ALP SERPL-CCNC: 68 U/L (ref 46–116)
ALT SERPL W P-5'-P-CCNC: 42 U/L (ref 12–78)
AMORPH URATE CRY URNS QL MICRO: ABNORMAL /HPF
ANION GAP SERPL CALCULATED.3IONS-SCNC: 5 MMOL/L (ref 4–13)
ANION GAP SERPL CALCULATED.3IONS-SCNC: 6 MMOL/L (ref 4–13)
AST SERPL W P-5'-P-CCNC: 47 U/L (ref 5–45)
BACTERIA UR QL AUTO: ABNORMAL /HPF
BASOPHILS # BLD AUTO: 0.04 THOUSANDS/ΜL (ref 0–0.1)
BASOPHILS NFR BLD AUTO: 0 % (ref 0–1)
BILIRUB SERPL-MCNC: 0.9 MG/DL (ref 0.2–1)
BILIRUB UR QL STRIP: NEGATIVE
BUN SERPL-MCNC: 15 MG/DL (ref 5–25)
BUN SERPL-MCNC: 15 MG/DL (ref 5–25)
CALCIUM ALBUM COR SERPL-MCNC: 9.7 MG/DL (ref 8.3–10.1)
CALCIUM SERPL-MCNC: 9.1 MG/DL (ref 8.3–10.1)
CALCIUM SERPL-MCNC: 9.2 MG/DL (ref 8.3–10.1)
CHLORIDE SERPL-SCNC: 103 MMOL/L (ref 100–108)
CHLORIDE SERPL-SCNC: 103 MMOL/L (ref 100–108)
CLARITY UR: ABNORMAL
CO2 SERPL-SCNC: 30 MMOL/L (ref 21–32)
CO2 SERPL-SCNC: 30 MMOL/L (ref 21–32)
COLOR UR: YELLOW
CREAT SERPL-MCNC: 1.09 MG/DL (ref 0.6–1.3)
CREAT SERPL-MCNC: 1.31 MG/DL (ref 0.6–1.3)
EOSINOPHIL # BLD AUTO: 0.04 THOUSAND/ΜL (ref 0–0.61)
EOSINOPHIL NFR BLD AUTO: 0 % (ref 0–6)
ERYTHROCYTE [DISTWIDTH] IN BLOOD BY AUTOMATED COUNT: 15.1 % (ref 11.6–15.1)
GFR SERPL CREATININE-BSD FRML MDRD: 67 ML/MIN/1.73SQ M
GFR SERPL CREATININE-BSD FRML MDRD: 84 ML/MIN/1.73SQ M
GLUCOSE SERPL-MCNC: 88 MG/DL (ref 65–140)
GLUCOSE SERPL-MCNC: 95 MG/DL (ref 65–140)
GLUCOSE UR STRIP-MCNC: NEGATIVE MG/DL
HCT VFR BLD AUTO: 48.6 % (ref 36.5–49.3)
HGB BLD-MCNC: 15 G/DL (ref 12–17)
HGB UR QL STRIP.AUTO: ABNORMAL
IMM GRANULOCYTES # BLD AUTO: 0.04 THOUSAND/UL (ref 0–0.2)
IMM GRANULOCYTES NFR BLD AUTO: 0 % (ref 0–2)
KETONES UR STRIP-MCNC: NEGATIVE MG/DL
LEUKOCYTE ESTERASE UR QL STRIP: ABNORMAL
LYMPHOCYTES # BLD AUTO: 1.86 THOUSANDS/ΜL (ref 0.6–4.47)
LYMPHOCYTES NFR BLD AUTO: 16 % (ref 14–44)
MCH RBC QN AUTO: 25.4 PG (ref 26.8–34.3)
MCHC RBC AUTO-ENTMCNC: 30.9 G/DL (ref 31.4–37.4)
MCV RBC AUTO: 82 FL (ref 82–98)
MONOCYTES # BLD AUTO: 1.06 THOUSAND/ΜL (ref 0.17–1.22)
MONOCYTES NFR BLD AUTO: 9 % (ref 4–12)
NEUTROPHILS # BLD AUTO: 8.92 THOUSANDS/ΜL (ref 1.85–7.62)
NEUTS SEG NFR BLD AUTO: 75 % (ref 43–75)
NITRITE UR QL STRIP: POSITIVE
NON-SQ EPI CELLS URNS QL MICRO: ABNORMAL /HPF
NRBC BLD AUTO-RTO: 0 /100 WBCS
OTHER STN SPEC: ABNORMAL
PH UR STRIP.AUTO: 5.5 [PH]
PLATELET # BLD AUTO: 263 THOUSANDS/UL (ref 149–390)
PMV BLD AUTO: 11.5 FL (ref 8.9–12.7)
POTASSIUM SERPL-SCNC: 5.5 MMOL/L (ref 3.5–5.3)
POTASSIUM SERPL-SCNC: 5.8 MMOL/L (ref 3.5–5.3)
PROT SERPL-MCNC: 8.3 G/DL (ref 6.4–8.2)
PROT UR STRIP-MCNC: ABNORMAL MG/DL
RBC # BLD AUTO: 5.91 MILLION/UL (ref 3.88–5.62)
RBC #/AREA URNS AUTO: ABNORMAL /HPF
SODIUM SERPL-SCNC: 138 MMOL/L (ref 136–145)
SODIUM SERPL-SCNC: 139 MMOL/L (ref 136–145)
SP GR UR STRIP.AUTO: 1.02 (ref 1–1.03)
UROBILINOGEN UR QL STRIP.AUTO: 0.2 E.U./DL
WBC # BLD AUTO: 11.96 THOUSAND/UL (ref 4.31–10.16)
WBC #/AREA URNS AUTO: ABNORMAL /HPF

## 2020-10-17 PROCEDURE — 80048 BASIC METABOLIC PNL TOTAL CA: CPT | Performed by: PHYSICIAN ASSISTANT

## 2020-10-17 PROCEDURE — 87086 URINE CULTURE/COLONY COUNT: CPT | Performed by: PHYSICIAN ASSISTANT

## 2020-10-17 PROCEDURE — 85025 COMPLETE CBC W/AUTO DIFF WBC: CPT | Performed by: PHYSICIAN ASSISTANT

## 2020-10-17 PROCEDURE — 80053 COMPREHEN METABOLIC PANEL: CPT | Performed by: PHYSICIAN ASSISTANT

## 2020-10-17 PROCEDURE — 87186 SC STD MICRODIL/AGAR DIL: CPT | Performed by: PHYSICIAN ASSISTANT

## 2020-10-17 PROCEDURE — 81001 URINALYSIS AUTO W/SCOPE: CPT | Performed by: PHYSICIAN ASSISTANT

## 2020-10-17 PROCEDURE — 74176 CT ABD & PELVIS W/O CONTRAST: CPT

## 2020-10-17 PROCEDURE — 99284 EMERGENCY DEPT VISIT MOD MDM: CPT | Performed by: PHYSICIAN ASSISTANT

## 2020-10-17 PROCEDURE — 87077 CULTURE AEROBIC IDENTIFY: CPT | Performed by: PHYSICIAN ASSISTANT

## 2020-10-17 PROCEDURE — 99284 EMERGENCY DEPT VISIT MOD MDM: CPT

## 2020-10-17 PROCEDURE — G1004 CDSM NDSC: HCPCS

## 2020-10-17 PROCEDURE — 36415 COLL VENOUS BLD VENIPUNCTURE: CPT | Performed by: PHYSICIAN ASSISTANT

## 2020-10-17 RX ORDER — CEPHALEXIN 500 MG/1
500 CAPSULE ORAL EVERY 12 HOURS SCHEDULED
Qty: 14 CAPSULE | Refills: 0 | Status: SHIPPED | OUTPATIENT
Start: 2020-10-17 | End: 2020-10-24

## 2020-10-19 ENCOUNTER — OFFICE VISIT (OUTPATIENT)
Dept: PHYSICAL THERAPY | Facility: CLINIC | Age: 60
End: 2020-10-19
Payer: COMMERCIAL

## 2020-10-19 DIAGNOSIS — M75.121 NONTRAUMATIC COMPLETE TEAR OF RIGHT ROTATOR CUFF: Primary | ICD-10-CM

## 2020-10-19 DIAGNOSIS — Z98.890 H/O ROTATOR CUFF SURGERY: ICD-10-CM

## 2020-10-19 LAB — BACTERIA UR CULT: ABNORMAL

## 2020-10-19 PROCEDURE — 97110 THERAPEUTIC EXERCISES: CPT

## 2020-10-19 PROCEDURE — 97112 NEUROMUSCULAR REEDUCATION: CPT

## 2020-10-21 ENCOUNTER — HOSPITAL ENCOUNTER (OUTPATIENT)
Dept: SLEEP CENTER | Facility: HOSPITAL | Age: 60
Discharge: HOME | End: 2020-10-21
Payer: COMMERCIAL

## 2020-10-21 ENCOUNTER — OFFICE VISIT (OUTPATIENT)
Dept: PHYSICAL THERAPY | Facility: CLINIC | Age: 60
End: 2020-10-21
Payer: COMMERCIAL

## 2020-10-21 DIAGNOSIS — M75.121 NONTRAUMATIC COMPLETE TEAR OF RIGHT ROTATOR CUFF: Primary | ICD-10-CM

## 2020-10-21 DIAGNOSIS — G47.10 HYPERSOMNOLENCE: ICD-10-CM

## 2020-10-21 DIAGNOSIS — Z98.890 H/O ROTATOR CUFF SURGERY: ICD-10-CM

## 2020-10-21 DIAGNOSIS — R06.83 SNORING: ICD-10-CM

## 2020-10-21 PROCEDURE — G0399 HOME SLEEP TEST/TYPE 3 PORTA: HCPCS

## 2020-10-21 PROCEDURE — 97110 THERAPEUTIC EXERCISES: CPT

## 2020-10-21 PROCEDURE — 97140 MANUAL THERAPY 1/> REGIONS: CPT

## 2020-10-26 ENCOUNTER — OFFICE VISIT (OUTPATIENT)
Dept: PHYSICAL THERAPY | Facility: CLINIC | Age: 60
End: 2020-10-26
Payer: COMMERCIAL

## 2020-10-26 DIAGNOSIS — Z98.890 H/O ROTATOR CUFF SURGERY: ICD-10-CM

## 2020-10-26 DIAGNOSIS — M75.121 NONTRAUMATIC COMPLETE TEAR OF RIGHT ROTATOR CUFF: Primary | ICD-10-CM

## 2020-10-26 PROCEDURE — 97112 NEUROMUSCULAR REEDUCATION: CPT

## 2020-10-26 PROCEDURE — 97140 MANUAL THERAPY 1/> REGIONS: CPT

## 2020-10-26 PROCEDURE — 97110 THERAPEUTIC EXERCISES: CPT

## 2020-10-28 ENCOUNTER — APPOINTMENT (OUTPATIENT)
Dept: PHYSICAL THERAPY | Facility: CLINIC | Age: 60
End: 2020-10-28
Payer: COMMERCIAL

## 2020-11-02 ENCOUNTER — APPOINTMENT (OUTPATIENT)
Dept: PHYSICAL THERAPY | Facility: CLINIC | Age: 60
End: 2020-11-02
Payer: COMMERCIAL

## 2020-11-04 ENCOUNTER — OFFICE VISIT (OUTPATIENT)
Dept: PHYSICAL THERAPY | Facility: CLINIC | Age: 60
End: 2020-11-04
Payer: COMMERCIAL

## 2020-11-04 DIAGNOSIS — M75.121 NONTRAUMATIC COMPLETE TEAR OF RIGHT ROTATOR CUFF: Primary | ICD-10-CM

## 2020-11-04 DIAGNOSIS — Z98.890 H/O ROTATOR CUFF SURGERY: ICD-10-CM

## 2020-11-04 PROCEDURE — 97112 NEUROMUSCULAR REEDUCATION: CPT

## 2020-11-04 PROCEDURE — 97140 MANUAL THERAPY 1/> REGIONS: CPT

## 2020-11-04 PROCEDURE — 97110 THERAPEUTIC EXERCISES: CPT

## 2020-11-09 ENCOUNTER — APPOINTMENT (OUTPATIENT)
Dept: PHYSICAL THERAPY | Facility: CLINIC | Age: 60
End: 2020-11-09
Payer: COMMERCIAL

## 2020-11-11 ENCOUNTER — OFFICE VISIT (OUTPATIENT)
Dept: PHYSICAL THERAPY | Facility: CLINIC | Age: 60
End: 2020-11-11
Payer: COMMERCIAL

## 2020-11-11 DIAGNOSIS — M75.121 NONTRAUMATIC COMPLETE TEAR OF RIGHT ROTATOR CUFF: Primary | ICD-10-CM

## 2020-11-11 DIAGNOSIS — Z98.890 H/O ROTATOR CUFF SURGERY: ICD-10-CM

## 2020-11-11 PROCEDURE — 97110 THERAPEUTIC EXERCISES: CPT

## 2020-11-11 PROCEDURE — 97112 NEUROMUSCULAR REEDUCATION: CPT

## 2020-11-16 ENCOUNTER — APPOINTMENT (OUTPATIENT)
Dept: PHYSICAL THERAPY | Facility: CLINIC | Age: 60
End: 2020-11-16
Payer: COMMERCIAL

## 2020-11-18 ENCOUNTER — APPOINTMENT (OUTPATIENT)
Dept: PHYSICAL THERAPY | Facility: CLINIC | Age: 60
End: 2020-11-18
Payer: COMMERCIAL

## 2020-11-23 ENCOUNTER — APPOINTMENT (OUTPATIENT)
Dept: PHYSICAL THERAPY | Facility: CLINIC | Age: 60
End: 2020-11-23
Payer: COMMERCIAL

## 2020-11-30 ENCOUNTER — APPOINTMENT (OUTPATIENT)
Dept: PHYSICAL THERAPY | Facility: CLINIC | Age: 60
End: 2020-11-30
Payer: COMMERCIAL

## 2020-12-02 ENCOUNTER — APPOINTMENT (OUTPATIENT)
Dept: PHYSICAL THERAPY | Facility: CLINIC | Age: 60
End: 2020-12-02
Payer: COMMERCIAL

## 2020-12-02 ENCOUNTER — EVALUATION (OUTPATIENT)
Dept: PHYSICAL THERAPY | Facility: CLINIC | Age: 60
End: 2020-12-02
Payer: COMMERCIAL

## 2020-12-02 DIAGNOSIS — M75.121 NONTRAUMATIC COMPLETE TEAR OF RIGHT ROTATOR CUFF: Primary | ICD-10-CM

## 2020-12-02 DIAGNOSIS — Z98.890 H/O ROTATOR CUFF SURGERY: ICD-10-CM

## 2020-12-02 PROCEDURE — 97112 NEUROMUSCULAR REEDUCATION: CPT | Performed by: PHYSICAL THERAPIST

## 2020-12-02 PROCEDURE — 97140 MANUAL THERAPY 1/> REGIONS: CPT | Performed by: PHYSICAL THERAPIST

## 2020-12-02 PROCEDURE — 97164 PT RE-EVAL EST PLAN CARE: CPT | Performed by: PHYSICAL THERAPIST

## 2020-12-04 ENCOUNTER — OFFICE VISIT (OUTPATIENT)
Dept: OBGYN CLINIC | Facility: CLINIC | Age: 60
End: 2020-12-04
Payer: COMMERCIAL

## 2020-12-04 VITALS
HEART RATE: 64 BPM | SYSTOLIC BLOOD PRESSURE: 124 MMHG | HEIGHT: 72 IN | DIASTOLIC BLOOD PRESSURE: 80 MMHG | TEMPERATURE: 97.3 F | BODY MASS INDEX: 33.86 KG/M2 | WEIGHT: 250 LBS

## 2020-12-04 DIAGNOSIS — Z98.890 S/P RIGHT ROTATOR CUFF REPAIR: Primary | ICD-10-CM

## 2020-12-04 PROCEDURE — 99213 OFFICE O/P EST LOW 20 MIN: CPT | Performed by: ORTHOPAEDIC SURGERY

## 2020-12-11 ENCOUNTER — DOCUMENTATION (OUTPATIENT)
Dept: PULMONOLOGY | Facility: CLINIC | Age: 60
End: 2020-12-11

## 2020-12-11 ENCOUNTER — CONSULT (OUTPATIENT)
Dept: PULMONOLOGY | Facility: CLINIC | Age: 60
End: 2020-12-11
Payer: COMMERCIAL

## 2020-12-11 VITALS
SYSTOLIC BLOOD PRESSURE: 120 MMHG | WEIGHT: 250 LBS | OXYGEN SATURATION: 97 % | HEIGHT: 72 IN | DIASTOLIC BLOOD PRESSURE: 80 MMHG | HEART RATE: 42 BPM | TEMPERATURE: 97.5 F | BODY MASS INDEX: 33.86 KG/M2

## 2020-12-11 DIAGNOSIS — G47.33 OSA (OBSTRUCTIVE SLEEP APNEA): Primary | ICD-10-CM

## 2020-12-11 PROCEDURE — 99243 OFF/OP CNSLTJ NEW/EST LOW 30: CPT | Performed by: INTERNAL MEDICINE

## 2020-12-15 ENCOUNTER — TELEMEDICINE (OUTPATIENT)
Dept: FAMILY MEDICINE CLINIC | Facility: HOSPITAL | Age: 60
End: 2020-12-15
Payer: COMMERCIAL

## 2020-12-15 VITALS — HEIGHT: 72 IN | WEIGHT: 250 LBS | BODY MASS INDEX: 33.86 KG/M2

## 2020-12-15 DIAGNOSIS — Z20.822 EXPOSURE TO COVID-19 VIRUS: ICD-10-CM

## 2020-12-15 DIAGNOSIS — B34.9 VIRAL INFECTION, UNSPECIFIED: ICD-10-CM

## 2020-12-15 PROCEDURE — 99213 OFFICE O/P EST LOW 20 MIN: CPT | Performed by: INTERNAL MEDICINE

## 2020-12-15 PROCEDURE — 1036F TOBACCO NON-USER: CPT | Performed by: INTERNAL MEDICINE

## 2020-12-15 PROCEDURE — U0003 INFECTIOUS AGENT DETECTION BY NUCLEIC ACID (DNA OR RNA); SEVERE ACUTE RESPIRATORY SYNDROME CORONAVIRUS 2 (SARS-COV-2) (CORONAVIRUS DISEASE [COVID-19]), AMPLIFIED PROBE TECHNIQUE, MAKING USE OF HIGH THROUGHPUT TECHNOLOGIES AS DESCRIBED BY CMS-2020-01-R: HCPCS | Performed by: INTERNAL MEDICINE

## 2020-12-16 LAB — SARS-COV-2 RNA SPEC QL NAA+PROBE: DETECTED

## 2020-12-17 NOTE — RESULT ENCOUNTER NOTE
Please call Tori Leone and let him know that his COVID-19 swab was positive  Continue symptomatic treatment  Advised he implement home isolation measures including      Staying home  Stay in a specific "sick room" or area and away from other people or animals, including pets  Wear a mask when leaving your room  Use a separate bathroom, if available  Wipe down all commonly touched surfaces with household   Please schedule follow up video visit Friday with Angel or myself

## 2020-12-18 ENCOUNTER — TELEMEDICINE (OUTPATIENT)
Dept: FAMILY MEDICINE CLINIC | Facility: HOSPITAL | Age: 60
End: 2020-12-18
Payer: COMMERCIAL

## 2020-12-18 VITALS — WEIGHT: 233 LBS | TEMPERATURE: 96.6 F | BODY MASS INDEX: 31.56 KG/M2 | HEIGHT: 72 IN

## 2020-12-18 DIAGNOSIS — U07.1 COVID-19 VIRUS INFECTION: Primary | ICD-10-CM

## 2020-12-18 PROCEDURE — 99213 OFFICE O/P EST LOW 20 MIN: CPT | Performed by: FAMILY MEDICINE

## 2020-12-18 PROCEDURE — 3008F BODY MASS INDEX DOCD: CPT | Performed by: INTERNAL MEDICINE

## 2021-01-13 ENCOUNTER — OFFICE VISIT (OUTPATIENT)
Dept: FAMILY MEDICINE CLINIC | Facility: HOSPITAL | Age: 61
End: 2021-01-13
Payer: COMMERCIAL

## 2021-01-13 VITALS — BODY MASS INDEX: 30.88 KG/M2 | HEIGHT: 72 IN | WEIGHT: 228 LBS

## 2021-01-13 DIAGNOSIS — E78.2 MIXED DYSLIPIDEMIA: ICD-10-CM

## 2021-01-13 DIAGNOSIS — G47.33 OSA (OBSTRUCTIVE SLEEP APNEA): Primary | ICD-10-CM

## 2021-01-13 DIAGNOSIS — N52.8 OTHER MALE ERECTILE DYSFUNCTION: ICD-10-CM

## 2021-01-13 DIAGNOSIS — E66.09 CLASS 1 OBESITY DUE TO EXCESS CALORIES WITH SERIOUS COMORBIDITY AND BODY MASS INDEX (BMI) OF 30.0 TO 30.9 IN ADULT: ICD-10-CM

## 2021-01-13 PROCEDURE — 3725F SCREEN DEPRESSION PERFORMED: CPT | Performed by: FAMILY MEDICINE

## 2021-01-13 PROCEDURE — 99214 OFFICE O/P EST MOD 30 MIN: CPT | Performed by: FAMILY MEDICINE

## 2021-01-15 NOTE — PROGRESS NOTES
Virtual Regular Visit      Assessment/Plan:    Problem List Items Addressed This Visit        Respiratory    MOHAN (obstructive sleep apnea) - Primary       Other    Mixed dyslipidemia    Other male erectile dysfunction      Other Visit Diagnoses     Class 1 obesity due to excess calories with serious comorbidity and body mass index (BMI) of 30 0 to 30 9 in adult          overall patient has been doing well  Has been losing weight through dietary control and increase activity  MOHAN  Stable  On autopap so should adjust as he continues to lose weight  Minimal use of Ed medications  F/u in 6 months  Reason for visit is   Chief Complaint   Patient presents with    Follow-up    Virtual Regular Visit        Encounter provider Brittaney Prasad MD    Provider located at [address] [place] [address]  [address] [place] [address]      Recent Visits  Date Type Provider Dept   01/13/21 Office Visit Brittaney Prasad MD [facility]   Showing recent visits within past 7 days and meeting all other requirements     Future Appointments  No visits were found meeting these conditions  Showing future appointments within next 150 days and meeting all other requirements        The patient was identified by name and date of birth  Reinaldo Washington was informed that this is a telemedicine visit and that the visit is being conducted through [address] [place] [address] and patient was informed that this is not a secure, HIPAA-compliant platform  He agrees to proceed     My office door was closed  No one else was in the room  He acknowledged consent and understanding of privacy and security of the video platform  The patient has agreed to participate and understands they can discontinue the visit at any time  Patient is aware this is a billable service  Subjective  Reinaldo Washington is a 56 y o  male    Pt seen for fu of chronic conditions       Since the last visit he did recover from covid-19 infection  He is feeling well  Back to his normal activities  Has been able to lose weight through improved diet  He has no new concerns  History reviewed  No pertinent past medical history  Past Surgical History:   Procedure Laterality Date    KNEE SURGERY      GA SHLDR ARTHROSCOP,SURG,W/ROTAT CUFF REPR Right 8/26/2020    Procedure: REPAIR ROTATOR CUFF  ARTHROSCOPIC,  BICEPS  TENOTOMY;  Surgeon: Caitlin Starkey DO;  Location:  MAIN OR;  Service: Orthopedics    GA SURGICAL ARTHROSCOPY SHOULDER LMTD DBRDMT 1/2 Right 8/26/2020    Procedure: ARTHROSCOPY SHOULDER;  Surgeon: Caitlin Starkey DO;  Location:  MAIN OR;  Service: Orthopedics       Current Outpatient Medications   Medication Sig Dispense Refill    Ascorbic Acid (VITAMIN C PO) Take by mouth      Cholecalciferol (VITAMIN D3 PO) Take by mouth      ELDERBERRY PO Take by mouth      Multiple Vitamins-Minerals (CENTRUM SILVER PO) Take by mouth      Multiple Vitamins-Minerals (ZINC PO) Take by mouth      sildenafil (VIAGRA) 100 mg tablet Take 100 mg by mouth daily as needed for erectile dysfunction      tadalafil (CIALIS) 20 MG tablet Take 20 mg by mouth daily as needed for erectile dysfunction      TURMERIC PO Take by mouth       No current facility-administered medications for this visit  Allergies   Allergen Reactions    Penicillins Itching     " a really long time ago"    Sulfa Antibiotics      itching       Review of Systems   Constitutional: Negative  Negative for activity change, appetite change, chills and diaphoresis  HENT: Negative for congestion and dental problem  Respiratory: Negative  Negative for apnea, chest tightness, shortness of breath and wheezing  Cardiovascular: Negative  Negative for chest pain, palpitations and leg swelling  Gastrointestinal: Negative  Negative for abdominal distention, abdominal pain, constipation, diarrhea and nausea  Genitourinary: Negative  Negative for difficulty urinating, dysuria and frequency  Video Exam    Vitals:    01/13/21 1724   Weight: 103 kg (228 lb)   Height: 6' (1 829 m)       Physical Exam  Constitutional:       General: He is not in acute distress  Appearance: He is well-developed  HENT:      Head: Normocephalic  Pulmonary:      Effort: Pulmonary effort is normal    Skin:     Capillary Refill: Capillary refill takes less than 2 seconds  Neurological:      Mental Status: He is alert and oriented to person, place, and time  Psychiatric:         Mood and Affect: Mood normal          Behavior: Behavior normal           I spent 10 minutes directly with the patient during this visit      VIRTUAL VISIT DISCLAIMER    Sheree Paget acknowledges that he has consented to an online visit or consultation  He understands that the online visit is based solely on information provided by him, and that, in the absence of a face-to-face physical evaluation by the physician, the diagnosis he receives is both limited and provisional in terms of accuracy and completeness  This is not intended to replace a full medical face-to-face evaluation by the physician  Sheree Paget understands and accepts these terms

## 2021-01-19 ENCOUNTER — OFFICE VISIT (OUTPATIENT)
Dept: FAMILY MEDICINE CLINIC | Facility: HOSPITAL | Age: 61
End: 2021-01-19

## 2021-01-19 ENCOUNTER — HOSPITAL ENCOUNTER (OUTPATIENT)
Dept: ULTRASOUND IMAGING | Facility: HOSPITAL | Age: 61
Discharge: HOME | End: 2021-01-19
Payer: COMMERCIAL

## 2021-01-19 VITALS
DIASTOLIC BLOOD PRESSURE: 64 MMHG | BODY MASS INDEX: 32.37 KG/M2 | OXYGEN SATURATION: 98 % | TEMPERATURE: 96 F | SYSTOLIC BLOOD PRESSURE: 130 MMHG | HEIGHT: 72 IN | HEART RATE: 78 BPM | WEIGHT: 239 LBS

## 2021-01-19 DIAGNOSIS — N50.819 PAIN IN TESTICLE, UNSPECIFIED LATERALITY: ICD-10-CM

## 2021-01-19 DIAGNOSIS — N50.819 PAIN IN TESTICLE, UNSPECIFIED LATERALITY: Primary | ICD-10-CM

## 2021-01-19 LAB
AMORPH URATE CRY URNS QL MICRO: ABNORMAL /HPF
BACTERIA UR QL AUTO: ABNORMAL /HPF
BILIRUB UR QL STRIP: NEGATIVE
CLARITY UR: ABNORMAL
COLOR UR: ABNORMAL
GLUCOSE UR STRIP-MCNC: NEGATIVE MG/DL
HGB UR QL STRIP.AUTO: ABNORMAL
KETONES UR STRIP-MCNC: ABNORMAL MG/DL
LEUKOCYTE ESTERASE UR QL STRIP: ABNORMAL
NITRITE UR QL STRIP: POSITIVE
NON-SQ EPI CELLS URNS QL MICRO: ABNORMAL /HPF
PH UR STRIP.AUTO: 6 [PH]
PROT UR STRIP-MCNC: ABNORMAL MG/DL
RBC #/AREA URNS AUTO: ABNORMAL /HPF
SL AMB  POCT GLUCOSE, UA: ABNORMAL
SL AMB LEUKOCYTE ESTERASE,UA: ABNORMAL
SL AMB POCT BILIRUBIN,UA: ABNORMAL
SL AMB POCT BLOOD,UA: ABNORMAL
SL AMB POCT CLARITY,UA: ABNORMAL
SL AMB POCT COLOR,UA: ABNORMAL
SL AMB POCT KETONES,UA: ABNORMAL
SL AMB POCT NITRITE,UA: ABNORMAL
SL AMB POCT PH,UA: 5
SL AMB POCT SPECIFIC GRAVITY,UA: 1.01
SL AMB POCT URINE PROTEIN: ABNORMAL
SL AMB POCT UROBILINOGEN: ABNORMAL
SP GR UR STRIP.AUTO: 1.03 (ref 1–1.03)
UROBILINOGEN UR QL STRIP.AUTO: 0.2 E.U./DL
WBC #/AREA URNS AUTO: ABNORMAL /HPF

## 2021-01-19 PROCEDURE — 81001 URINALYSIS AUTO W/SCOPE: CPT | Performed by: NURSE PRACTITIONER

## 2021-01-19 PROCEDURE — 81002 URINALYSIS NONAUTO W/O SCOPE: CPT | Performed by: NURSE PRACTITIONER

## 2021-01-19 PROCEDURE — 87077 CULTURE AEROBIC IDENTIFY: CPT | Performed by: NURSE PRACTITIONER

## 2021-01-19 PROCEDURE — 99214 OFFICE O/P EST MOD 30 MIN: CPT | Performed by: NURSE PRACTITIONER

## 2021-01-19 PROCEDURE — 87086 URINE CULTURE/COLONY COUNT: CPT | Performed by: NURSE PRACTITIONER

## 2021-01-19 PROCEDURE — 1036F TOBACCO NON-USER: CPT | Performed by: NURSE PRACTITIONER

## 2021-01-19 PROCEDURE — 87186 SC STD MICRODIL/AGAR DIL: CPT | Performed by: NURSE PRACTITIONER

## 2021-01-19 PROCEDURE — 76870 US EXAM SCROTUM: CPT

## 2021-01-19 PROCEDURE — 3008F BODY MASS INDEX DOCD: CPT | Performed by: NURSE PRACTITIONER

## 2021-01-19 RX ORDER — CIPROFLOXACIN 500 MG/1
500 TABLET, FILM COATED ORAL EVERY 12 HOURS SCHEDULED
Qty: 20 TABLET | Refills: 0 | Status: SHIPPED | OUTPATIENT
Start: 2021-01-19 | End: 2021-01-29

## 2021-01-19 NOTE — PROGRESS NOTES
Assessment/Plan:    No problem-specific Assessment & Plan notes found for this encounter  Diagnoses and all orders for this visit:    Pain in testicle, unspecified laterality  -     POCT urine dip  -     Urine culture  -     Urinalysis with microscopic  -     US scrotum and testicles; Future  -     ciprofloxacin (CIPRO) 500 mg tablet; Take 1 tablet (500 mg total) by mouth every 12 (twelve) hours for 10 days      Urine dip abnormal suspicious for possible infection  Will send for UA, C&S and start antibiotic as rx'd  STAT testicle US scheduled now for further eval - r/o torsion vs epididymitis  Continue ibuprofen 600-800mg as needed and apply ice compresses      Subjective:      Patient ID: Rosa Maria Aaron is a 56 y o  male  Has had pain in left testicle x3 days  No injury or trigger known  Worse overnight last night and disrupted sleep  Took ibuprofen a few times last night  Vomited x1  Felt chills last night but no fever  Has been applying ice  The following portions of the patient's history were reviewed and updated as appropriate: allergies, current medications, past medical history, past social history, past surgical history and problem list     Review of Systems   Genitourinary: Positive for scrotal swelling (left side) and testicular pain (left side)  Negative for decreased urine volume, difficulty urinating, dysuria, frequency and hematuria  Objective:      /64 (Patient Position: Sitting, Cuff Size: Large)   Pulse 78   Temp (!) 96 °F (35 6 °C) (Temporal)   Ht 6' (1 829 m)   Wt 108 kg (239 lb)   SpO2 98%   BMI 32 41 kg/m²          Physical Exam  Vitals signs reviewed  Exam conducted with a chaperone present  Constitutional:       General: He is not in acute distress (uncomfortable in pain)  Appearance: Normal appearance  He is obese  HENT:      Head: Normocephalic and atraumatic  Eyes:      General: No scleral icterus    Pulmonary:      Effort: Pulmonary effort is normal  No respiratory distress  Genitourinary:     Penis: Normal        Scrotum/Testes:         Right: Mass, tenderness or swelling not present  Left: Tenderness and swelling present  Mass not present  Epididymis:      Right: Normal       Left: Normal       Comments: Left testicle warm w/o erythema/discoloration  Neurological:      General: No focal deficit present  Mental Status: He is alert and oriented to person, place, and time     Psychiatric:         Mood and Affect: Mood normal          Behavior: Behavior normal

## 2021-01-21 LAB — BACTERIA UR CULT: ABNORMAL

## 2021-02-08 ENCOUNTER — TELEPHONE (OUTPATIENT)
Dept: PULMONOLOGY | Facility: CLINIC | Age: 61
End: 2021-02-08

## 2021-02-08 NOTE — TELEPHONE ENCOUNTER
Called [facility] to get updated information for CPAP machine, rep stated have tried to reach out to pt for over a month for set with no connection  Attempted to contact pt to give [address] for CPAP set up, not able to leave voicemail due to mailbox being full  Will try to contact pt at later time

## 2022-07-26 ENCOUNTER — TELEPHONE (OUTPATIENT)
Dept: FAMILY MEDICINE CLINIC | Facility: HOSPITAL | Age: 62
End: 2022-07-26

## 2022-08-01 NOTE — TELEPHONE ENCOUNTER
08/01/22 2:41 PM     Thank you for your request  Your request has been received, reviewed, and the patient chart updated  The PCP has successfully been removed with a patient attribution note  This message will now be completed      Thank you  Brandan Rodriguez

## (undated) DEVICE — NEEDLE 25G X 1 1/2

## (undated) DEVICE — FILTER STRAW 1.7

## (undated) DEVICE — VAPR COOLPULSE 90 ELECTRODE 90 DEGREES SUCTION WITH INTEGRATED HANDPIECE: Brand: VAPR COOLPULSE

## (undated) DEVICE — POSITIONER TRIMANO LIMB BEACH CHAIR

## (undated) DEVICE — SPONGE SCRUB 4 PCT CHLORHEXIDINE

## (undated) DEVICE — TUBING ARTHROSCOPY DUALWAVE OUTFLOW TUBE SET

## (undated) DEVICE — TIGERTAPE 2MM X 7IN AR-7237-7T

## (undated) DEVICE — SUT VICRYL 3-0 SH 27 IN J416H

## (undated) DEVICE — BETHLEHEM UNIV MAJOR ORTHO,KIT: Brand: CARDINAL HEALTH

## (undated) DEVICE — SURGI KIT INSTRUMENT ORGANIZER

## (undated) DEVICE — DRAPE ISO IRRIGATION POUCH 1016

## (undated) DEVICE — GLOVE SRG BIOGEL 7

## (undated) DEVICE — 3M™ STERI-DRAPE™ U-DRAPE 1015: Brand: STERI-DRAPE™

## (undated) DEVICE — CHLORAPREP HI-LITE 26ML ORANGE

## (undated) DEVICE — CANNULA BUTTON 8 X 30MM PASSPORT

## (undated) DEVICE — SUT MONOCRYL 4-0 PS-2 18 IN Y496G

## (undated) DEVICE — GLOVE INDICATOR PI UNDERGLOVE SZ 7 BLUE

## (undated) DEVICE — TUBING SUCTION 5MM X 12 FT

## (undated) DEVICE — STOCKINETTE IMPERVIOUS LG

## (undated) DEVICE — GLOVE SRG BIOGEL 8.5

## (undated) DEVICE — SYRINGE 3ML LL

## (undated) DEVICE — BLADE SHAVER DISSECTOR 4MM 13CM COOLCUT

## (undated) DEVICE — NEEDLE SPINAL18G X 3.5 IN QUINCKE

## (undated) DEVICE — PUDDLE VAC

## (undated) DEVICE — NEEDLE 18 G X 1 1/2

## (undated) DEVICE — 3M™ MICROFOAM™ SURGICAL TAPE 4 ROLLS/CARTON 6 CARTONS/CASE 1528-3: Brand: 3M™ MICROFOAM™

## (undated) DEVICE — CANNULA 5.75 X 70MM BARREL SHAPED BOWL

## (undated) DEVICE — ARTHROSCOPY FLOOR MAT

## (undated) DEVICE — NEEDLE SUT SCORPION MULTIFIRE

## (undated) DEVICE — INTENDED FOR TISSUE SEPARATION, AND OTHER PROCEDURES THAT REQUIRE A SHARP SURGICAL BLADE TO PUNCTURE OR CUT.: Brand: BARD-PARKER SAFETY BLADES SIZE 11, STERILE

## (undated) DEVICE — CANNULA 7 X70MM THRD SEAL SIDE PORT

## (undated) DEVICE — GLOVE INDICATOR PI UNDERGLOVE SZ 8.5 BLUE

## (undated) DEVICE — SUT FIBERLINK #2 26IN AR-7235

## (undated) DEVICE — PAD GROUNDING ADULT

## (undated) DEVICE — TUBING ARTHROSCOPIC WAVE  MAIN PUMP